# Patient Record
Sex: MALE | Race: WHITE | Employment: OTHER | URBAN - METROPOLITAN AREA
[De-identification: names, ages, dates, MRNs, and addresses within clinical notes are randomized per-mention and may not be internally consistent; named-entity substitution may affect disease eponyms.]

---

## 2018-11-19 PROBLEM — D50.0 IRON DEFICIENCY ANEMIA SECONDARY TO BLOOD LOSS (CHRONIC): Status: ACTIVE | Noted: 2018-11-19

## 2018-11-28 DIAGNOSIS — D50.0 IRON DEFICIENCY ANEMIA SECONDARY TO BLOOD LOSS (CHRONIC): ICD-10-CM

## 2018-11-28 LAB
ALBUMIN SERPL-MCNC: 4.2 G/DL (ref 3.9–4.9)
ALP BLD-CCNC: 80 U/L (ref 35–104)
ALT SERPL-CCNC: 15 U/L (ref 0–41)
ANION GAP SERPL CALCULATED.3IONS-SCNC: 16 MEQ/L (ref 7–13)
AST SERPL-CCNC: 28 U/L (ref 0–40)
BILIRUB SERPL-MCNC: 0.3 MG/DL (ref 0–1.2)
BUN BLDV-MCNC: 23 MG/DL (ref 8–23)
CALCIUM SERPL-MCNC: 9.5 MG/DL (ref 8.6–10.2)
CHLORIDE BLD-SCNC: 107 MEQ/L (ref 98–107)
CO2: 23 MEQ/L (ref 22–29)
CREAT SERPL-MCNC: 1.13 MG/DL (ref 0.7–1.2)
FERRITIN: 195.5 NG/ML (ref 30–400)
GFR AFRICAN AMERICAN: >60
GFR NON-AFRICAN AMERICAN: >60
GLOBULIN: 3.1 G/DL (ref 2.3–3.5)
GLUCOSE BLD-MCNC: 100 MG/DL (ref 74–109)
IRON SATURATION: 31 % (ref 11–46)
IRON: 88 UG/DL (ref 59–158)
POTASSIUM SERPL-SCNC: 4.3 MEQ/L (ref 3.5–5.1)
SODIUM BLD-SCNC: 146 MEQ/L (ref 132–144)
TOTAL IRON BINDING CAPACITY: 281 UG/DL (ref 178–450)
TOTAL PROTEIN: 7.3 G/DL (ref 6.4–8.1)

## 2019-01-09 DIAGNOSIS — D47.3 ESSENTIAL THROMBOCYTHEMIA (HCC): ICD-10-CM

## 2019-01-09 DIAGNOSIS — D50.0 IRON DEFICIENCY ANEMIA SECONDARY TO BLOOD LOSS (CHRONIC): ICD-10-CM

## 2019-01-09 LAB
ALBUMIN SERPL-MCNC: 4.3 G/DL (ref 3.9–4.9)
ALP BLD-CCNC: 87 U/L (ref 35–104)
ALT SERPL-CCNC: 7 U/L (ref 0–41)
AST SERPL-CCNC: 27 U/L (ref 0–40)
BILIRUB SERPL-MCNC: 0.3 MG/DL (ref 0–1.2)
BILIRUBIN DIRECT: <0.2 MG/DL (ref 0–0.3)
BILIRUBIN, INDIRECT: NORMAL MG/DL (ref 0–0.6)
TOTAL PROTEIN: 7.5 G/DL (ref 6.4–8.1)

## 2019-03-27 DIAGNOSIS — D50.0 IRON DEFICIENCY ANEMIA SECONDARY TO BLOOD LOSS (CHRONIC): ICD-10-CM

## 2019-03-27 DIAGNOSIS — D47.3 ESSENTIAL THROMBOCYTHEMIA (HCC): ICD-10-CM

## 2019-03-27 LAB
ALBUMIN SERPL-MCNC: 4.2 G/DL (ref 3.5–4.6)
ALP BLD-CCNC: 80 U/L (ref 35–104)
ALT SERPL-CCNC: 18 U/L (ref 0–41)
ANION GAP SERPL CALCULATED.3IONS-SCNC: 11 MEQ/L (ref 9–15)
AST SERPL-CCNC: 35 U/L (ref 0–40)
BILIRUB SERPL-MCNC: 0.4 MG/DL (ref 0.2–0.7)
BUN BLDV-MCNC: 22 MG/DL (ref 8–23)
CALCIUM SERPL-MCNC: 8.8 MG/DL (ref 8.5–9.9)
CHLORIDE BLD-SCNC: 107 MEQ/L (ref 95–107)
CO2: 24 MEQ/L (ref 20–31)
CREAT SERPL-MCNC: 1.17 MG/DL (ref 0.7–1.2)
GFR AFRICAN AMERICAN: >60
GFR NON-AFRICAN AMERICAN: 58.6
GLOBULIN: 2.8 G/DL (ref 2.3–3.5)
GLUCOSE BLD-MCNC: 104 MG/DL (ref 70–99)
POTASSIUM SERPL-SCNC: 4.1 MEQ/L (ref 3.4–4.9)
SODIUM BLD-SCNC: 142 MEQ/L (ref 135–144)
TOTAL PROTEIN: 7 G/DL (ref 6.3–8)

## 2019-06-28 ENCOUNTER — HOSPITAL ENCOUNTER (INPATIENT)
Age: 84
LOS: 3 days | Discharge: NURSING, REHAB OR LONG-TERM CARE | DRG: 292 | End: 2019-07-01
Attending: EMERGENCY MEDICINE | Admitting: INTERNAL MEDICINE
Payer: MEDICARE

## 2019-06-28 ENCOUNTER — APPOINTMENT (OUTPATIENT)
Dept: ULTRASOUND IMAGING | Age: 84
DRG: 292 | End: 2019-06-28
Payer: MEDICARE

## 2019-06-28 ENCOUNTER — APPOINTMENT (OUTPATIENT)
Dept: GENERAL RADIOLOGY | Age: 84
DRG: 292 | End: 2019-06-28
Payer: MEDICARE

## 2019-06-28 DIAGNOSIS — D64.9 CHRONIC ANEMIA: ICD-10-CM

## 2019-06-28 DIAGNOSIS — I50.9 CONGESTIVE HEART FAILURE, UNSPECIFIED HF CHRONICITY, UNSPECIFIED HEART FAILURE TYPE (HCC): ICD-10-CM

## 2019-06-28 DIAGNOSIS — I48.20 CHRONIC ATRIAL FIBRILLATION (HCC): ICD-10-CM

## 2019-06-28 DIAGNOSIS — R60.9 PERIPHERAL EDEMA: Primary | ICD-10-CM

## 2019-06-28 DIAGNOSIS — Z66 DNR (DO NOT RESUSCITATE): ICD-10-CM

## 2019-06-28 LAB
ABO/RH: NORMAL
ABO/RH: NORMAL
ALBUMIN SERPL-MCNC: 4 G/DL (ref 3.5–4.6)
ALP BLD-CCNC: 91 U/L (ref 35–104)
ALT SERPL-CCNC: 16 U/L (ref 0–41)
ANION GAP SERPL CALCULATED.3IONS-SCNC: 14 MEQ/L (ref 9–15)
ANISOCYTOSIS: PRESENT
ANTIBODY SCREEN: NORMAL
AST SERPL-CCNC: 33 U/L (ref 0–40)
BANDED NEUTROPHILS RELATIVE PERCENT: 2 %
BASOPHILS ABSOLUTE: 0 K/UL (ref 0–0.2)
BASOPHILS RELATIVE PERCENT: 0 %
BILIRUB SERPL-MCNC: 0.5 MG/DL (ref 0.2–0.7)
BLOOD BANK DISPENSE STATUS: NORMAL
BLOOD BANK DISPENSE STATUS: NORMAL
BLOOD BANK PRODUCT CODE: NORMAL
BLOOD BANK PRODUCT CODE: NORMAL
BPU ID: NORMAL
BPU ID: NORMAL
BUN BLDV-MCNC: 27 MG/DL (ref 8–23)
CALCIUM SERPL-MCNC: 9.3 MG/DL (ref 8.5–9.9)
CHLORIDE BLD-SCNC: 106 MEQ/L (ref 95–107)
CO2: 24 MEQ/L (ref 20–31)
CREAT SERPL-MCNC: 1.46 MG/DL (ref 0.7–1.2)
DESCRIPTION BLOOD BANK: NORMAL
DESCRIPTION BLOOD BANK: NORMAL
EKG ATRIAL RATE: 77 BPM
EKG Q-T INTERVAL: 394 MS
EKG QRS DURATION: 108 MS
EKG QTC CALCULATION (BAZETT): 481 MS
EKG R AXIS: -39 DEGREES
EKG T AXIS: 128 DEGREES
EKG VENTRICULAR RATE: 90 BPM
EOSINOPHILS ABSOLUTE: 0.1 K/UL (ref 0–0.7)
EOSINOPHILS RELATIVE PERCENT: 2 %
FERRITIN: 109.1 NG/ML (ref 30–400)
FOLATE: >20 NG/ML (ref 7.3–26.1)
GFR AFRICAN AMERICAN: 54.9
GFR NON-AFRICAN AMERICAN: 45.4
GLOBULIN: 3.2 G/DL (ref 2.3–3.5)
GLUCOSE BLD-MCNC: 124 MG/DL (ref 70–99)
HCT VFR BLD CALC: 23 % (ref 42–52)
HEMOGLOBIN: 7.4 G/DL (ref 14–18)
INR BLD: 2.2
IRON SATURATION: 21 % (ref 11–46)
IRON: 82 UG/DL (ref 59–158)
LV EF: 25 %
LVEF MODALITY: NORMAL
LYMPHOCYTES ABSOLUTE: 0.9 K/UL (ref 1–4.8)
LYMPHOCYTES RELATIVE PERCENT: 13 %
MAGNESIUM: 2.3 MG/DL (ref 1.7–2.4)
MCH RBC QN AUTO: 31.7 PG (ref 27–31.3)
MCHC RBC AUTO-ENTMCNC: 32 % (ref 33–37)
MCV RBC AUTO: 98.9 FL (ref 80–100)
MONOCYTES ABSOLUTE: 0.4 K/UL (ref 0.2–0.8)
MONOCYTES RELATIVE PERCENT: 5 %
NEUTROPHILS ABSOLUTE: 5.9 K/UL (ref 1.4–6.5)
NEUTROPHILS RELATIVE PERCENT: 79 %
OVALOCYTES: ABNORMAL
PDW BLD-RTO: 22.6 % (ref 11.5–14.5)
PLATELET # BLD: 313 K/UL (ref 130–400)
POIKILOCYTES: ABNORMAL
POTASSIUM SERPL-SCNC: 4 MEQ/L (ref 3.4–4.9)
PRO-BNP: NORMAL PG/ML
PROTHROMBIN TIME: 21.5 SEC (ref 9–11.5)
RBC # BLD: 2.32 M/UL (ref 4.7–6.1)
SCHISTOCYTES: ABNORMAL
SODIUM BLD-SCNC: 144 MEQ/L (ref 135–144)
TOTAL IRON BINDING CAPACITY: 387 UG/DL (ref 178–450)
TOTAL PROTEIN: 7.2 G/DL (ref 6.3–8)
TROPONIN: 0.06 NG/ML (ref 0–0.01)
TROPONIN: 0.06 NG/ML (ref 0–0.01)
TROPONIN: 0.07 NG/ML (ref 0–0.01)
TSH SERPL DL<=0.05 MIU/L-ACNC: 3.64 UIU/ML (ref 0.44–3.86)
VITAMIN B-12: 1551 PG/ML (ref 232–1245)
WBC # BLD: 7.3 K/UL (ref 4.8–10.8)

## 2019-06-28 PROCEDURE — 36415 COLL VENOUS BLD VENIPUNCTURE: CPT

## 2019-06-28 PROCEDURE — 71045 X-RAY EXAM CHEST 1 VIEW: CPT

## 2019-06-28 PROCEDURE — 6370000000 HC RX 637 (ALT 250 FOR IP): Performed by: INTERNAL MEDICINE

## 2019-06-28 PROCEDURE — 84484 ASSAY OF TROPONIN QUANT: CPT

## 2019-06-28 PROCEDURE — 93971 EXTREMITY STUDY: CPT

## 2019-06-28 PROCEDURE — 93306 TTE W/DOPPLER COMPLETE: CPT

## 2019-06-28 PROCEDURE — 96374 THER/PROPH/DIAG INJ IV PUSH: CPT

## 2019-06-28 PROCEDURE — 99285 EMERGENCY DEPT VISIT HI MDM: CPT

## 2019-06-28 PROCEDURE — 80053 COMPREHEN METABOLIC PANEL: CPT

## 2019-06-28 PROCEDURE — 83550 IRON BINDING TEST: CPT

## 2019-06-28 PROCEDURE — 51702 INSERT TEMP BLADDER CATH: CPT

## 2019-06-28 PROCEDURE — 2580000003 HC RX 258: Performed by: INTERNAL MEDICINE

## 2019-06-28 PROCEDURE — P9016 RBC LEUKOCYTES REDUCED: HCPCS

## 2019-06-28 PROCEDURE — 82607 VITAMIN B-12: CPT

## 2019-06-28 PROCEDURE — 82746 ASSAY OF FOLIC ACID SERUM: CPT

## 2019-06-28 PROCEDURE — 94761 N-INVAS EAR/PLS OXIMETRY MLT: CPT

## 2019-06-28 PROCEDURE — 85610 PROTHROMBIN TIME: CPT

## 2019-06-28 PROCEDURE — 94640 AIRWAY INHALATION TREATMENT: CPT

## 2019-06-28 PROCEDURE — 86923 COMPATIBILITY TEST ELECTRIC: CPT

## 2019-06-28 PROCEDURE — 1210000000 HC MED SURG R&B

## 2019-06-28 PROCEDURE — 6360000002 HC RX W HCPCS: Performed by: EMERGENCY MEDICINE

## 2019-06-28 PROCEDURE — 6360000002 HC RX W HCPCS: Performed by: INTERNAL MEDICINE

## 2019-06-28 PROCEDURE — 82728 ASSAY OF FERRITIN: CPT

## 2019-06-28 PROCEDURE — 36430 TRANSFUSION BLD/BLD COMPNT: CPT

## 2019-06-28 PROCEDURE — 85025 COMPLETE CBC W/AUTO DIFF WBC: CPT

## 2019-06-28 PROCEDURE — 93005 ELECTROCARDIOGRAM TRACING: CPT | Performed by: EMERGENCY MEDICINE

## 2019-06-28 PROCEDURE — 93005 ELECTROCARDIOGRAM TRACING: CPT

## 2019-06-28 PROCEDURE — 6370000000 HC RX 637 (ALT 250 FOR IP): Performed by: EMERGENCY MEDICINE

## 2019-06-28 PROCEDURE — 94760 N-INVAS EAR/PLS OXIMETRY 1: CPT

## 2019-06-28 PROCEDURE — 86900 BLOOD TYPING SEROLOGIC ABO: CPT

## 2019-06-28 PROCEDURE — 83880 ASSAY OF NATRIURETIC PEPTIDE: CPT

## 2019-06-28 PROCEDURE — 83540 ASSAY OF IRON: CPT

## 2019-06-28 PROCEDURE — 86850 RBC ANTIBODY SCREEN: CPT

## 2019-06-28 PROCEDURE — 86901 BLOOD TYPING SEROLOGIC RH(D): CPT

## 2019-06-28 PROCEDURE — 83735 ASSAY OF MAGNESIUM: CPT

## 2019-06-28 PROCEDURE — 84443 ASSAY THYROID STIM HORMONE: CPT

## 2019-06-28 RX ORDER — SODIUM CHLORIDE 0.9 % (FLUSH) 0.9 %
10 SYRINGE (ML) INJECTION EVERY 12 HOURS SCHEDULED
Status: DISCONTINUED | OUTPATIENT
Start: 2019-06-28 | End: 2019-07-01 | Stop reason: HOSPADM

## 2019-06-28 RX ORDER — ONDANSETRON 2 MG/ML
4 INJECTION INTRAMUSCULAR; INTRAVENOUS EVERY 6 HOURS PRN
Status: DISCONTINUED | OUTPATIENT
Start: 2019-06-28 | End: 2019-07-01 | Stop reason: HOSPADM

## 2019-06-28 RX ORDER — FOLIC ACID 1 MG/1
1 TABLET ORAL DAILY
Status: DISCONTINUED | OUTPATIENT
Start: 2019-06-28 | End: 2019-07-01 | Stop reason: HOSPADM

## 2019-06-28 RX ORDER — SODIUM CHLORIDE 0.9 % (FLUSH) 0.9 %
10 SYRINGE (ML) INJECTION PRN
Status: DISCONTINUED | OUTPATIENT
Start: 2019-06-28 | End: 2019-07-01 | Stop reason: HOSPADM

## 2019-06-28 RX ORDER — FUROSEMIDE 10 MG/ML
20 INJECTION INTRAMUSCULAR; INTRAVENOUS ONCE
Status: COMPLETED | OUTPATIENT
Start: 2019-06-28 | End: 2019-06-28

## 2019-06-28 RX ORDER — FUROSEMIDE 10 MG/ML
20 INJECTION INTRAMUSCULAR; INTRAVENOUS 3 TIMES DAILY
Status: DISCONTINUED | OUTPATIENT
Start: 2019-06-28 | End: 2019-06-29

## 2019-06-28 RX ORDER — WARFARIN SODIUM 2 MG/1
2 TABLET ORAL
Status: ACTIVE | OUTPATIENT
Start: 2019-06-28 | End: 2019-06-29

## 2019-06-28 RX ORDER — ANAGRELIDE 0.5 MG/1
1 CAPSULE ORAL 2 TIMES DAILY
Status: DISCONTINUED | OUTPATIENT
Start: 2019-06-28 | End: 2019-07-01 | Stop reason: HOSPADM

## 2019-06-28 RX ORDER — SODIUM CHLORIDE 0.9 % (FLUSH) 0.9 %
10 SYRINGE (ML) INJECTION EVERY 12 HOURS
Status: DISCONTINUED | OUTPATIENT
Start: 2019-06-28 | End: 2019-06-29

## 2019-06-28 RX ORDER — IPRATROPIUM BROMIDE AND ALBUTEROL SULFATE 2.5; .5 MG/3ML; MG/3ML
1 SOLUTION RESPIRATORY (INHALATION) ONCE
Status: COMPLETED | OUTPATIENT
Start: 2019-06-28 | End: 2019-06-28

## 2019-06-28 RX ORDER — 0.9 % SODIUM CHLORIDE 0.9 %
250 INTRAVENOUS SOLUTION INTRAVENOUS ONCE
Status: COMPLETED | OUTPATIENT
Start: 2019-06-28 | End: 2019-06-29

## 2019-06-28 RX ORDER — FERROUS SULFATE 325(65) MG
325 TABLET ORAL
Status: DISCONTINUED | OUTPATIENT
Start: 2019-06-29 | End: 2019-07-01 | Stop reason: HOSPADM

## 2019-06-28 RX ORDER — WARFARIN SODIUM 2 MG/1
2 TABLET ORAL DAILY
Status: DISCONTINUED | OUTPATIENT
Start: 2019-06-28 | End: 2019-06-28 | Stop reason: DRUGHIGH

## 2019-06-28 RX ADMIN — ANAGRELIDE 1 MG: 0.5 CAPSULE ORAL at 20:15

## 2019-06-28 RX ADMIN — FUROSEMIDE 20 MG: 10 INJECTION, SOLUTION INTRAMUSCULAR; INTRAVENOUS at 20:13

## 2019-06-28 RX ADMIN — SODIUM CHLORIDE 250 ML: 9 INJECTION, SOLUTION INTRAVENOUS at 18:20

## 2019-06-28 RX ADMIN — METOPROLOL TARTRATE 25 MG: 25 TABLET ORAL at 14:47

## 2019-06-28 RX ADMIN — FUROSEMIDE 20 MG: 10 INJECTION, SOLUTION INTRAMUSCULAR; INTRAVENOUS at 22:01

## 2019-06-28 RX ADMIN — FUROSEMIDE 20 MG: 10 INJECTION, SOLUTION INTRAMUSCULAR; INTRAVENOUS at 14:47

## 2019-06-28 RX ADMIN — Medication 10 ML: at 14:48

## 2019-06-28 RX ADMIN — METOPROLOL TARTRATE 25 MG: 25 TABLET ORAL at 20:13

## 2019-06-28 RX ADMIN — IPRATROPIUM BROMIDE AND ALBUTEROL SULFATE 1 AMPULE: .5; 3 SOLUTION RESPIRATORY (INHALATION) at 08:29

## 2019-06-28 RX ADMIN — FUROSEMIDE 20 MG: 10 INJECTION, SOLUTION INTRAMUSCULAR; INTRAVENOUS at 08:21

## 2019-06-28 RX ADMIN — FUROSEMIDE 20 MG: 10 INJECTION, SOLUTION INTRAMUSCULAR; INTRAVENOUS at 22:00

## 2019-06-28 ASSESSMENT — PAIN DESCRIPTION - FREQUENCY: FREQUENCY: INTERMITTENT

## 2019-06-28 ASSESSMENT — ENCOUNTER SYMPTOMS
SHORTNESS OF BREATH: 0
CONSTIPATION: 0
BLOOD IN STOOL: 0
ANAL BLEEDING: 0
STRIDOR: 0
SORE THROAT: 0
WHEEZING: 0
VOMITING: 0
EYE PAIN: 0
CHOKING: 0
TROUBLE SWALLOWING: 0
ABDOMINAL DISTENTION: 0
COUGH: 0
SINUS PRESSURE: 0
APNEA: 0
WHEEZING: 1
BACK PAIN: 0
ABDOMINAL PAIN: 0
VOICE CHANGE: 0
DIARRHEA: 0
EYE DISCHARGE: 0
NAUSEA: 0
COLOR CHANGE: 0
FACIAL SWELLING: 0
CHEST TIGHTNESS: 0
EYE REDNESS: 0
SHORTNESS OF BREATH: 1

## 2019-06-28 ASSESSMENT — PAIN SCALES - GENERAL
PAINLEVEL_OUTOF10: 0
PAINLEVEL_OUTOF10: 0
PAINLEVEL_OUTOF10: 9

## 2019-06-28 ASSESSMENT — PAIN DESCRIPTION - ORIENTATION: ORIENTATION: RIGHT

## 2019-06-28 ASSESSMENT — PAIN DESCRIPTION - LOCATION: LOCATION: LEG

## 2019-06-28 NOTE — ED NOTES
Pt A&Ox4, Pt hard of hearing per Drt and Dtr in law at bedside. states \"my legs don't work good\", Pt's daughter at bedside states Delroy Rios gets short of breath when walking for the past two weeks\", Pt stated \"I've had enough of this\" and that is why he came in today. Pt denies chest pain, denies N&V.      Deandre Rosales RN  06/28/19 3390

## 2019-06-28 NOTE — ED PROVIDER NOTES
[address] [facility]  eMERGENCY dEPARTMENT eNCOUnter      Pt Name: Josef Walls  MRN: [id number]  [place] 9/9/1929  Date of evaluation: 6/28/2019  Provider: Bruce Pederson MD    [address] [place] [address]       Chief Complaint   Patient presents with    Leg Swelling     x 2 weeks    Shortness of Breath       HISTORY OF PRESENT ILLNESS   (Location/Symptom, Timing/Onset,Context/Setting, Quality, Duration, Modifying Factors, Severity)  Note limiting factors. Josef Walls is a 80 y.o. male who presents to the emergency department patient is here brought it here by the family with the chief complaining of increasing weakness used to walk with a walker but difficult time walking short of breath worse on exertion increasing swelling of the legs patient has a history of cardiac arrhythmia as per family members, but has no heart doctor, patient does see blood doctors for his blood transfusion and at an infusion denies seeing any blood in the stools does take iron by mouth including short of breath no documented fever. Patient denies pain anywhere    HPI    NursingNotes were reviewed. REVIEW OF SYSTEMS    (2-9 systems for level 4, 10 or more for level 5)     Review of Systems   Constitutional: Positive for activity change, appetite change and fatigue. Negative for fever. HENT: Negative for congestion, drooling, facial swelling, mouth sores, nosebleeds, sinus pressure, sore throat, trouble swallowing and voice change. Eyes: Negative for pain, discharge, redness and visual disturbance. Respiratory: Positive for shortness of breath and wheezing. Negative for cough, choking, chest tightness and stridor. Cardiovascular: Positive for leg swelling. Negative for chest pain. Gastrointestinal: Negative for abdominal pain, blood in stool, constipation, diarrhea and vomiting. Endocrine: Negative for cold intolerance, polyphagia and polyuria.    Genitourinary: Negative for dysuria, flank pain, frequency, genital sores and Aunt     No Known Problems Maternal Uncle     No Known Problems Paternal Aunt     No Known Problems Paternal Uncle     No Known Problems Maternal Grandmother     No Known Problems Maternal Grandfather     No Known Problems Paternal Grandmother     No Known Problems Paternal Grandfather     No Known Problems Other           SOCIAL HISTORY       Social History     Socioeconomic History    Marital status:       Spouse name: None    Number of children: None    Years of education: None    Highest education level: None   Occupational History    None   Social Needs    Financial resource strain: None    Food insecurity:     Worry: None     Inability: None    Transportation needs:     Medical: None     Non-medical: None   Tobacco Use    Smoking status: Former Smoker     Packs/day: 0.50     Years: 20.00     Pack years: 10.00     Types: Cigarettes     Last attempt to quit: Jose Francisco Stone     Years since quittin.5    Smokeless tobacco: Never Used   Substance and Sexual Activity    Alcohol use: No    Drug use: No    Sexual activity: None   Lifestyle    Physical activity:     Days per week: None     Minutes per session: None    Stress: None   Relationships    Social connections:     Talks on phone: None     Gets together: None     Attends Worship service: None     Active member of club or organization: None     Attends meetings of clubs or organizations: None     Relationship status: None    Intimate partner violence:     Fear of current or ex partner: None     Emotionally abused: None     Physically abused: None     Forced sexual activity: None   Other Topics Concern    None   Social History Narrative    None       SCREENINGS      @FLOW([id number])@      PHYSICAL EXAM    (up to 7 for level 4, 8 or more for level 5)     ED Triage Vitals [19 0753]   BP Temp Temp Source Pulse Resp SpO2 Height Weight   133/65 97.7 °F (36.5 °C) Oral 94 18 (!) 89 % 5' 8\" (1.727 m) 165 lb (74.8 kg)       Physical Exam 18 18 18    Temp: 97.7 °F (36.5 °C)  97.5 °F (36.4 °C)    TempSrc: Oral  Oral    SpO2: (!) 89% 97% 97% 98%   Weight: 165 lb (74.8 kg)      Height: 5' 8\" (1.727 m)              MDM  Number of Diagnoses or Management Options  Chronic anemia:   Chronic atrial fibrillation ([address]):   Congestive heart failure, unspecified HF chronicity, unspecified heart failure type ([address]): established and improving  DNR (do not resuscitate):   Peripheral edema:   Diagnosis management comments: Patient with a history of cardiac arrhythmia chronic anemia and has a chronic intermittent CHF comes to this emergency brought by family members for deteriorating short of breath increasing weakness and difficult time ambulating difficult time getting at home by the family EKG performed patient has atrial fib with a premature ventricular beats left axis deviation and a rate of 90 per minute QRS duration 108 ms with QT interval 394 ms       Amount and/or Complexity of Data Reviewed  Clinical lab tests: ordered and reviewed  Tests in the radiology section of CPT®: ordered and reviewed        CRITICAL CARE TIME   Total Critical Care time was  minutes, excluding separately reportableprocedures. There was a high probability of clinicallysignificant/life threatening deterioration in the patient's condition which required my urgent intervention. NSULTS:  IP CONSULT TO CARDIOLOGY  IP CONSULT TO SOCIAL WORK  IP CONSULT TO PHARMACY    PROCEDURES:  Unless otherwise noted below, none     Procedures    FINAL IMPRESSION      1. Peripheral edema    2. Congestive heart failure, unspecified HF chronicity, unspecified heart failure type ([address]    3. Chronic atrial fibrillation ([address]    4. Chronic anemia    5.  DNR (do not resuscitate)          DISPOSITION/PLAN   DISPOSITION        PATIENT REFERRED TO:  [address] [place] [address] MD Blaise Atwood [phone]            DISCHARGE MEDICATIONS:  Current Discharge Medication List (Please note that portions of this note were completed with a voice recognition program.  Efforts were made to edit the dictations but occasionally words are mis-transcribed.)    Karan Anand MD (electronically signed)  Attending Emergency Physician       Karan Anand MD  06/28/19 2948       Karan Anand MD  06/28/19 1674

## 2019-06-28 NOTE — H&P
currently lives at home    TOBACCO:   reports that he quit smoking about 41 years ago. His smoking use included cigarettes. He has a 10.00 pack-year smoking history. He has never used smokeless tobacco.  ETOH:   reports that he does not drink alcohol. Family History:       Reviewed in detail and negative for DM, CAD, Cancer, CVA. Positive as follows:        Problem Relation Age of Onset    Thyroid Disease Mother     Heart Attack Father     No Known Problems Sister     No Known Problems Brother     No Known Problems Maternal Aunt     No Known Problems Maternal Uncle     No Known Problems Paternal Aunt     No Known Problems Paternal Uncle     No Known Problems Maternal Grandmother     No Known Problems Maternal Grandfather     No Known Problems Paternal Grandmother     No Known Problems Paternal Grandfather     No Known Problems Other        REVIEW OF SYSTEMS:   Pertinent positives as noted in the HPI. All other systems reviewed and negative. PHYSICAL EXAM:    /67   Pulse 84   Temp 97.7 °F (36.5 °C) (Oral)   Resp 18   Ht 5' 8\" (1.727 m)   Wt 165 lb (74.8 kg)   SpO2 97%   BMI 25.09 kg/m²     General appearance:  No apparent distress, appears stated age and cooperative. HEENT:  Normal cephalic, atraumatic without obvious deformity. Pupils equal, round, and reactive to light. Extra ocular muscles intact. Conjunctivae/corneas clear. Neck: Supple, with full range of motion. No jugular venous distention. Trachea midline. Respiratory:  Normal respiratory effort. Clear to auscultation, bilaterally without Rales/Wheezes/Rhonchi. Cardiovascular:  Regular rate and rhythm with normal S1/S2 without murmurs, rubs or gallops. Abdomen: Soft, non-tender, non-distended with normal bowel sounds. Musculoskeletal:  Massive edema bilaterally. .  Skin: Skin color, texture, turgor normal.  No rashes or lesions. Neurologic:  Neurovascularly intact without any focal sensory/motor deficits.

## 2019-06-28 NOTE — CONSULTS
 Pulmonary embolism (HCC)     Type 2 diabetes mellitus (HCC)        PSHx:  Past Surgical History:   Procedure Laterality Date    BACK SURGERY      INGUINAL HERNIA REPAIR Bilateral        Social Hx:  Social History     Socioeconomic History    Marital status:       Spouse name: None    Number of children: None    Years of education: None    Highest education level: None   Occupational History    None   Social Needs    Financial resource strain: None    Food insecurity:     Worry: None     Inability: None    Transportation needs:     Medical: None     Non-medical: None   Tobacco Use    Smoking status: Former Smoker     Packs/day: 0.50     Years: 20.00     Pack years: 10.00     Types: Cigarettes     Last attempt to quit: Jacki Dotson     Years since quittin.5    Smokeless tobacco: Never Used   Substance and Sexual Activity    Alcohol use: No    Drug use: No    Sexual activity: None   Lifestyle    Physical activity:     Days per week: None     Minutes per session: None    Stress: None   Relationships    Social connections:     Talks on phone: None     Gets together: None     Attends [employer] service: None     Active member of club or organization: None     Attends meetings of clubs or organizations: None     Relationship status: None    Intimate partner violence:     Fear of current or ex partner: None     Emotionally abused: None     Physically abused: None     Forced sexual activity: None   Other Topics Concern    None   Social History Narrative    None       Family Hx:  Family History   Problem Relation Age of Onset    Thyroid Disease Mother     Heart Attack Father     No Known Problems Sister     No Known Problems Brother     No Known Problems Maternal Aunt     No Known Problems Maternal Uncle     No Known Problems Paternal Aunt     No Known Problems Paternal Uncle     No Known Problems Maternal Grandmother     No Known Problems Maternal Grandfather     No Known Problems

## 2019-06-28 NOTE — PROGRESS NOTES
Chart reviewed. Patient was admitted to [facility] after being brought into ED by family due to increase in weakness, SOB, and leg swelling. Patient seen by cardiology and ordered further testing. Patient also has orders to be evaluated by PT/OT. SS to follow after being evaluated by therapies and cardiac testing for DC recommendations.

## 2019-06-29 LAB
ANION GAP SERPL CALCULATED.3IONS-SCNC: 15 MEQ/L (ref 9–15)
BUN BLDV-MCNC: 26 MG/DL (ref 8–23)
CALCIUM SERPL-MCNC: 9 MG/DL (ref 8.5–9.9)
CHLORIDE BLD-SCNC: 105 MEQ/L (ref 95–107)
CO2: 26 MEQ/L (ref 20–31)
CREAT SERPL-MCNC: 1.52 MG/DL (ref 0.7–1.2)
GFR AFRICAN AMERICAN: 52.4
GFR NON-AFRICAN AMERICAN: 43.3
GLUCOSE BLD-MCNC: 91 MG/DL (ref 70–99)
HCT VFR BLD CALC: 26.9 % (ref 42–52)
HEMOGLOBIN: 8.9 G/DL (ref 14–18)
INR BLD: 2
MCH RBC QN AUTO: 31.8 PG (ref 27–31.3)
MCHC RBC AUTO-ENTMCNC: 32.9 % (ref 33–37)
MCV RBC AUTO: 96.6 FL (ref 80–100)
PDW BLD-RTO: 21.1 % (ref 11.5–14.5)
PLATELET # BLD: 250 K/UL (ref 130–400)
POTASSIUM REFLEX MAGNESIUM: 3 MEQ/L (ref 3.4–4.9)
PRO-BNP: NORMAL PG/ML
PROTHROMBIN TIME: 19 SEC (ref 9–11.5)
RBC # BLD: 2.79 M/UL (ref 4.7–6.1)
SODIUM BLD-SCNC: 146 MEQ/L (ref 135–144)
WBC # BLD: 6.4 K/UL (ref 4.8–10.8)

## 2019-06-29 PROCEDURE — 6370000000 HC RX 637 (ALT 250 FOR IP): Performed by: INTERNAL MEDICINE

## 2019-06-29 PROCEDURE — 85027 COMPLETE CBC AUTOMATED: CPT

## 2019-06-29 PROCEDURE — 80048 BASIC METABOLIC PNL TOTAL CA: CPT

## 2019-06-29 PROCEDURE — 85610 PROTHROMBIN TIME: CPT

## 2019-06-29 PROCEDURE — 2580000003 HC RX 258: Performed by: INTERNAL MEDICINE

## 2019-06-29 PROCEDURE — 6360000002 HC RX W HCPCS: Performed by: INTERNAL MEDICINE

## 2019-06-29 PROCEDURE — 83880 ASSAY OF NATRIURETIC PEPTIDE: CPT

## 2019-06-29 PROCEDURE — 1210000000 HC MED SURG R&B

## 2019-06-29 PROCEDURE — 97530 THERAPEUTIC ACTIVITIES: CPT | Performed by: PHYSICAL THERAPIST

## 2019-06-29 PROCEDURE — 97162 PT EVAL MOD COMPLEX 30 MIN: CPT | Performed by: PHYSICAL THERAPIST

## 2019-06-29 PROCEDURE — 36415 COLL VENOUS BLD VENIPUNCTURE: CPT

## 2019-06-29 PROCEDURE — 97110 THERAPEUTIC EXERCISES: CPT | Performed by: PHYSICAL THERAPIST

## 2019-06-29 PROCEDURE — 82274 ASSAY TEST FOR BLOOD FECAL: CPT

## 2019-06-29 PROCEDURE — 2700000000 HC OXYGEN THERAPY PER DAY

## 2019-06-29 PROCEDURE — 83735 ASSAY OF MAGNESIUM: CPT

## 2019-06-29 RX ORDER — DOCUSATE SODIUM 100 MG/1
100 CAPSULE, LIQUID FILLED ORAL 2 TIMES DAILY
Status: DISCONTINUED | OUTPATIENT
Start: 2019-06-29 | End: 2019-07-01 | Stop reason: HOSPADM

## 2019-06-29 RX ORDER — SENNA PLUS 8.6 MG/1
1 TABLET ORAL NIGHTLY
Status: DISCONTINUED | OUTPATIENT
Start: 2019-06-29 | End: 2019-07-01 | Stop reason: HOSPADM

## 2019-06-29 RX ORDER — POTASSIUM CHLORIDE 750 MG/1
40 TABLET, EXTENDED RELEASE ORAL ONCE
Status: COMPLETED | OUTPATIENT
Start: 2019-06-29 | End: 2019-06-29

## 2019-06-29 RX ORDER — POLYETHYLENE GLYCOL 3350 17 G/17G
17 POWDER, FOR SOLUTION ORAL DAILY
Status: DISCONTINUED | OUTPATIENT
Start: 2019-06-29 | End: 2019-07-01 | Stop reason: HOSPADM

## 2019-06-29 RX ORDER — FUROSEMIDE 10 MG/ML
20 INJECTION INTRAMUSCULAR; INTRAVENOUS DAILY
Status: DISCONTINUED | OUTPATIENT
Start: 2019-06-29 | End: 2019-06-30

## 2019-06-29 RX ADMIN — FOLIC ACID 1 MG: 1 TABLET ORAL at 08:11

## 2019-06-29 RX ADMIN — FUROSEMIDE 20 MG: 10 INJECTION, SOLUTION INTRAMUSCULAR; INTRAVENOUS at 08:12

## 2019-06-29 RX ADMIN — METOPROLOL TARTRATE 25 MG: 25 TABLET ORAL at 08:11

## 2019-06-29 RX ADMIN — METOPROLOL TARTRATE 25 MG: 25 TABLET ORAL at 20:18

## 2019-06-29 RX ADMIN — POLYETHYLENE GLYCOL 3350 17 G: 17 POWDER, FOR SOLUTION ORAL at 08:11

## 2019-06-29 RX ADMIN — POTASSIUM CHLORIDE 40 MEQ: 10 TABLET, EXTENDED RELEASE ORAL at 08:21

## 2019-06-29 RX ADMIN — Medication 10 ML: at 08:18

## 2019-06-29 RX ADMIN — ANAGRELIDE 1 MG: 0.5 CAPSULE ORAL at 08:22

## 2019-06-29 RX ADMIN — DOCUSATE SODIUM 100 MG: 100 CAPSULE, LIQUID FILLED ORAL at 20:18

## 2019-06-29 RX ADMIN — DOCUSATE SODIUM 100 MG: 100 CAPSULE, LIQUID FILLED ORAL at 08:11

## 2019-06-29 RX ADMIN — FERROUS SULFATE TAB 325 MG (65 MG ELEMENTAL FE) 325 MG: 325 (65 FE) TAB at 08:11

## 2019-06-29 RX ADMIN — SENNOSIDES 8.6 MG: 8.6 TABLET, FILM COATED ORAL at 20:18

## 2019-06-29 RX ADMIN — ANAGRELIDE 1 MG: 0.5 CAPSULE ORAL at 20:20

## 2019-06-29 RX ADMIN — Medication 10 ML: at 20:23

## 2019-06-29 RX ADMIN — Medication 10 ML: at 01:10

## 2019-06-29 ASSESSMENT — PAIN SCALES - GENERAL
PAINLEVEL_OUTOF10: 0
PAINLEVEL_OUTOF10: 0

## 2019-06-29 NOTE — PROGRESS NOTES
2nd unit PRBCs done infusing. VSS. No s/s transfusion reaction noted. Patient verbalizes no complaints at this time.

## 2019-06-30 LAB
ANION GAP SERPL CALCULATED.3IONS-SCNC: 15 MEQ/L (ref 9–15)
ANISOCYTOSIS: PRESENT
BASOPHILS ABSOLUTE: 0 K/UL (ref 0–0.2)
BASOPHILS RELATIVE PERCENT: 0.4 %
BUN BLDV-MCNC: 28 MG/DL (ref 8–23)
CALCIUM SERPL-MCNC: 8.9 MG/DL (ref 8.5–9.9)
CHLORIDE BLD-SCNC: 106 MEQ/L (ref 95–107)
CO2: 24 MEQ/L (ref 20–31)
CREAT SERPL-MCNC: 1.44 MG/DL (ref 0.7–1.2)
EOSINOPHILS ABSOLUTE: 0.1 K/UL (ref 0–0.7)
EOSINOPHILS RELATIVE PERCENT: 0.9 %
GFR AFRICAN AMERICAN: 55.8
GFR NON-AFRICAN AMERICAN: 46.1
GLUCOSE BLD-MCNC: 89 MG/DL (ref 70–99)
HCT VFR BLD CALC: 28.4 % (ref 42–52)
HEMOCCULT STL QL: NORMAL
HEMOGLOBIN: 9.3 G/DL (ref 14–18)
INR BLD: 1.8
LYMPHOCYTES ABSOLUTE: 0.6 K/UL (ref 1–4.8)
LYMPHOCYTES RELATIVE PERCENT: 9.4 %
MCH RBC QN AUTO: 32 PG (ref 27–31.3)
MCHC RBC AUTO-ENTMCNC: 32.6 % (ref 33–37)
MCV RBC AUTO: 98.1 FL (ref 80–100)
MONOCYTES ABSOLUTE: 0.4 K/UL (ref 0.2–0.8)
MONOCYTES RELATIVE PERCENT: 5.7 %
NEUTROPHILS ABSOLUTE: 5.3 K/UL (ref 1.4–6.5)
NEUTROPHILS RELATIVE PERCENT: 83.6 %
PDW BLD-RTO: 20.8 % (ref 11.5–14.5)
PLATELET # BLD: 254 K/UL (ref 130–400)
POTASSIUM SERPL-SCNC: 3.3 MEQ/L (ref 3.4–4.9)
PROTHROMBIN TIME: 16.9 SEC (ref 9–11.5)
RBC # BLD: 2.9 M/UL (ref 4.7–6.1)
SODIUM BLD-SCNC: 145 MEQ/L (ref 135–144)
WBC # BLD: 6.3 K/UL (ref 4.8–10.8)

## 2019-06-30 PROCEDURE — 82274 ASSAY TEST FOR BLOOD FECAL: CPT

## 2019-06-30 PROCEDURE — 1210000000 HC MED SURG R&B

## 2019-06-30 PROCEDURE — 97116 GAIT TRAINING THERAPY: CPT | Performed by: PHYSICAL THERAPIST

## 2019-06-30 PROCEDURE — 99233 SBSQ HOSP IP/OBS HIGH 50: CPT | Performed by: INTERNAL MEDICINE

## 2019-06-30 PROCEDURE — 36415 COLL VENOUS BLD VENIPUNCTURE: CPT

## 2019-06-30 PROCEDURE — 80048 BASIC METABOLIC PNL TOTAL CA: CPT

## 2019-06-30 PROCEDURE — 6370000000 HC RX 637 (ALT 250 FOR IP): Performed by: INTERNAL MEDICINE

## 2019-06-30 PROCEDURE — 2580000003 HC RX 258: Performed by: INTERNAL MEDICINE

## 2019-06-30 PROCEDURE — 85025 COMPLETE CBC W/AUTO DIFF WBC: CPT

## 2019-06-30 PROCEDURE — 6360000002 HC RX W HCPCS: Performed by: INTERNAL MEDICINE

## 2019-06-30 PROCEDURE — 97535 SELF CARE MNGMENT TRAINING: CPT

## 2019-06-30 PROCEDURE — 97110 THERAPEUTIC EXERCISES: CPT | Performed by: PHYSICAL THERAPIST

## 2019-06-30 PROCEDURE — 85610 PROTHROMBIN TIME: CPT

## 2019-06-30 PROCEDURE — 97166 OT EVAL MOD COMPLEX 45 MIN: CPT

## 2019-06-30 PROCEDURE — 97530 THERAPEUTIC ACTIVITIES: CPT

## 2019-06-30 PROCEDURE — 97530 THERAPEUTIC ACTIVITIES: CPT | Performed by: PHYSICAL THERAPIST

## 2019-06-30 RX ORDER — POTASSIUM CHLORIDE 750 MG/1
40 TABLET, EXTENDED RELEASE ORAL ONCE
Status: COMPLETED | OUTPATIENT
Start: 2019-06-30 | End: 2019-06-30

## 2019-06-30 RX ORDER — FUROSEMIDE 10 MG/ML
20 INJECTION INTRAMUSCULAR; INTRAVENOUS 2 TIMES DAILY
Status: DISCONTINUED | OUTPATIENT
Start: 2019-06-30 | End: 2019-07-01 | Stop reason: HOSPADM

## 2019-06-30 RX ORDER — POTASSIUM CHLORIDE 20MEQ/15ML
20 LIQUID (ML) ORAL DAILY
Status: DISCONTINUED | OUTPATIENT
Start: 2019-07-01 | End: 2019-07-01 | Stop reason: HOSPADM

## 2019-06-30 RX ADMIN — Medication 10 ML: at 20:11

## 2019-06-30 RX ADMIN — DOCUSATE SODIUM 100 MG: 100 CAPSULE, LIQUID FILLED ORAL at 20:10

## 2019-06-30 RX ADMIN — SACUBITRIL AND VALSARTAN 1 TABLET: 24; 26 TABLET, FILM COATED ORAL at 14:22

## 2019-06-30 RX ADMIN — SENNOSIDES 8.6 MG: 8.6 TABLET, FILM COATED ORAL at 20:10

## 2019-06-30 RX ADMIN — FOLIC ACID 1 MG: 1 TABLET ORAL at 08:18

## 2019-06-30 RX ADMIN — POLYETHYLENE GLYCOL 3350 17 G: 17 POWDER, FOR SOLUTION ORAL at 08:16

## 2019-06-30 RX ADMIN — DOCUSATE SODIUM 100 MG: 100 CAPSULE, LIQUID FILLED ORAL at 08:18

## 2019-06-30 RX ADMIN — FUROSEMIDE 20 MG: 10 INJECTION, SOLUTION INTRAMUSCULAR; INTRAVENOUS at 17:49

## 2019-06-30 RX ADMIN — Medication 10 ML: at 08:27

## 2019-06-30 RX ADMIN — FUROSEMIDE 20 MG: 10 INJECTION, SOLUTION INTRAMUSCULAR; INTRAVENOUS at 08:18

## 2019-06-30 RX ADMIN — SACUBITRIL AND VALSARTAN 1 TABLET: 24; 26 TABLET, FILM COATED ORAL at 20:10

## 2019-06-30 RX ADMIN — FERROUS SULFATE TAB 325 MG (65 MG ELEMENTAL FE) 325 MG: 325 (65 FE) TAB at 08:17

## 2019-06-30 RX ADMIN — ANAGRELIDE 1 MG: 0.5 CAPSULE ORAL at 20:11

## 2019-06-30 RX ADMIN — POTASSIUM CHLORIDE 40 MEQ: 10 TABLET, EXTENDED RELEASE ORAL at 08:24

## 2019-06-30 RX ADMIN — ANAGRELIDE 1 MG: 0.5 CAPSULE ORAL at 08:19

## 2019-06-30 RX ADMIN — METOPROLOL TARTRATE 25 MG: 25 TABLET ORAL at 08:18

## 2019-06-30 RX ADMIN — METOPROLOL TARTRATE 25 MG: 25 TABLET ORAL at 20:10

## 2019-06-30 ASSESSMENT — PAIN SCALES - GENERAL
PAINLEVEL_OUTOF10: 0

## 2019-06-30 NOTE — PROGRESS NOTES
content normal.         LABS:  Recent Results (from the past 24 hour(s))   BLOOD OCCULT STOOL SCREEN #1    Collection Time: 06/29/19  1:05 PM   Result Value Ref Range    OCCULT BLOOD FECAL Negative  Normal Range:Negative      Protime-INR    Collection Time: 06/30/19  6:27 AM   Result Value Ref Range    Protime 16.9 (H) 9.0 - 11.5 sec    INR 1.8    Basic Metabolic Panel    Collection Time: 06/30/19  6:27 AM   Result Value Ref Range    Sodium 145 (H) 135 - 144 mEq/L    Potassium 3.3 (L) 3.4 - 4.9 mEq/L    Chloride 106 95 - 107 mEq/L    CO2 24 20 - 31 mEq/L    Anion Gap 15 9 - 15 mEq/L    Glucose 89 70 - 99 mg/dL    BUN 28 (H) 8 - 23 mg/dL    CREATININE 1.44 (H) 0.70 - 1.20 mg/dL    GFR Non-African American 46.1 (L) >60    GFR  55.8 (L) >60    Calcium 8.9 8.5 - 9.9 mg/dL   CBC Auto Differential    Collection Time: 06/30/19  6:27 AM   Result Value Ref Range    WBC 6.3 4.8 - 10.8 K/uL    RBC 2.90 (L) 4.70 - 6.10 M/uL    Hemoglobin 9.3 (L) 14.0 - 18.0 g/dL    Hematocrit 28.4 (L) 42.0 - 52.0 %    MCV 98.1 80.0 - 100.0 fL    MCH 32.0 (H) 27.0 - 31.3 pg    MCHC 32.6 (L) 33.0 - 37.0 %    RDW 20.8 (H) 11.5 - 14.5 %    Platelets 715 819 - 691 K/uL    Neutrophils % 83.6 %    Lymphocytes % 9.4 %    Monocytes % 5.7 %    Eosinophils % 0.9 %    Basophils % 0.4 %    Neutrophils # 5.3 1.4 - 6.5 K/uL    Lymphocytes # 0.6 (L) 1.0 - 4.8 K/uL    Monocytes # 0.4 0.2 - 0.8 K/uL    Eosinophils # 0.1 0.0 - 0.7 K/uL    Basophils # 0.0 0.0 - 0.2 K/uL    Anisocytosis PRESENT      Troponin:    Lab Results   Component Value Date    TROPONINI 0.055 06/28/2019       EKG: atrial fibrillation      ASSESSMENT:    NSTEMI- Type II  AD combined HF  Hx of chronic Afib  Hx of DVT s/p IVCF  Hx of GERD  Hypokalemia. CKD      PLAN:   1. As always, aggressive risk factor modification is strongly recommended. We should adhere to the JNC VIII guidelines for HTN management and the NCEP ATP III guidelines for LDL-C management.   2. Continue with IV

## 2019-06-30 NOTE — PROGRESS NOTES
Hospitalist Progress Note      PCP: Juma Almodovar MD    Date of Admission: 6/28/2019    Chief Complaint: weakness, edema    Subjective: pt in gra[place], looks comfortable     Medications:  Reviewed    Infusion Medications   Scheduled Medications    sacubitril-valsartan  1 tablet Oral BID    polyethylene glycol  17 g Oral Daily    docusate sodium  100 mg Oral BID    senna  1 tablet Oral Nightly    furosemide  20 mg Intravenous Daily    sodium chloride flush  10 mL Intravenous 2 times per day    anagrelide  1 mg Oral BID    ferrous sulfate  325 mg Oral Daily with breakfast    folic acid  1 mg Oral Daily    metoprolol tartrate  25 mg Oral BID     PRN Meds: sodium chloride flush, magnesium hydroxide, ondansetron      Intake/Output Summary (Last 24 hours) at 6/30/2019 0834  Last data filed at 6/30/2019 0828  Gross per 24 hour   Intake 370 ml   Output 1500 ml   Net -1130 ml       Exam:    /67   Pulse 72   Temp 97.9 °F (36.6 °C) (Oral)   Resp 22   Ht 5' 8\" (1.727 m)   Wt 165 lb (74.8 kg)   SpO2 93%   BMI 25.09 kg/m²     General appearance: No apparent distress, appears stated age and cooperative. HEENT: Pupils equal, round, and reactive to light. Conjunctivae/corneas clear. Neck: Supple, with full range of motion. No jugular venous distention. Trachea midline. Respiratory:  Normal respiratory effort. Clear to auscultation, bilaterally without Rales/Wheezes/Rhonchi. Cardiovascular: Regular rate and rhythm with normal S1/S2 without murmurs, rubs or gallops. Abdomen: Soft, non-tender, non-distended with normal bowel sounds. Musculoskeletal:massive edema bilaterally. Full range of motion without deformity. Skin: Skin color, texture, turgor normal.  No rashes or lesions. Neurologic:  Neurovascularly intact without any focal sensory/motor deficits.  Cranial nerves: II-XII intact, grossly non-focal.  Psychiatric: Alert and oriented, thought content appropriate, normal insight  Capillary

## 2019-06-30 NOTE — PROGRESS NOTES
includes back surgery and Inguinal hernia repair (Bilateral). Restrictions  Restrictions/Precautions  Restrictions/Precautions: Fall Risk, General Precautions  Required Braces or Orthoses?: No  Subjective   General  Additional Pertinent Hx: On coumadin, Afib,   Family / Caregiver Present: Yes  Referring Practitioner: Dr. Tramaine Christian to Stand: Minimal Assistance; Moderate Assistance  Stand to sit: Minimal Assistance;Contact guard assistance  Ambulation  Ambulation?: Yes  Ambulation 1  Surface: level tile  Device: Rolling Walker  Other Apparatus: Wheelchair follow  Assistance: Contact guard assistance  Quality of Gait: flexed posture with good walker awareness  Gait Deviations: Slow Mary[place];Decreased step length  Distance: 40' x 2 with seated rest break between bouts  Comments: Verbal cues for posture, required short seated rest break between bouts of walking   Stairs/Curb  Stairs?: No     Balance  Posture: Fair  Sitting - Static: Good  Sitting - Dynamic: Fair  Standing - Static: Fair;-  Standing - Dynamic: Fair;-  Exercises  Quad Sets: x20 ea  Hip Flexion: seated marching x20 ea  Knee Long Arc Quad: x20 ea  Ankle Pumps: x20  Other exercises  Other exercises?: Yes  Other exercises 1: Iso pillow squeeze 5\"x20  Other exercises 2: seated HR/TR x20  Other exercises 3: Mini squat x5  Other exercises 4: sit <>stand x3       G-Code     OutComes Score   AM-PAC Score     Goals  Short term goals  Time Frame for Short term goals: 3-5 days  Short term goal 1: Pt to be I with transfers for improved safety  Short term goal 2: Pt to be I with bed mobility  Short term goal 3: Pt to demo 4+/5 strength B LE to facilitate improved functional mobility  Short term goal 4: Pt to demo good overall standing balance for decreased risk for falls  Short term goal 5: Pt to ambulate 100' with LRAD with improved safety  Long term goals  Time Frame for Long term goals : same as STG's; determine D/C plan  Patient Goals   Patient goals : none stated    Plan    Plan  Times per week: 5-7  Times per day: Daily(1-2x)  Plan weeks: 1 week  Current Treatment Recommendations: Strengthening, Transfer Training, Neuromuscular Re-education, ROM, ADL/Self-care Training, Balance Training, Gait Training, Home Exercise Program, Modalities, Safety Education & Training, Stair training, Functional Mobility Training  Safety Devices  Type of devices:  All fall risk precautions in place, Bed alarm in place, Call light within reach     Therapy Time   Individual Concurrent Group Co-treatment   Time In  1055         Time Out  1135           Minutes  [address] [place] [address]

## 2019-07-01 ENCOUNTER — HOSPITAL ENCOUNTER (INPATIENT)
Age: 84
LOS: 8 days | Discharge: HOME | DRG: 291 | End: 2019-07-09
Attending: INTERNAL MEDICINE | Admitting: INTERNAL MEDICINE
Payer: MEDICARE

## 2019-07-01 VITALS
HEART RATE: 68 BPM | HEIGHT: 68 IN | SYSTOLIC BLOOD PRESSURE: 106 MMHG | OXYGEN SATURATION: 94 % | DIASTOLIC BLOOD PRESSURE: 45 MMHG | WEIGHT: 165 LBS | BODY MASS INDEX: 25.01 KG/M2 | RESPIRATION RATE: 20 BRPM | TEMPERATURE: 97.7 F

## 2019-07-01 DIAGNOSIS — I50.9 CHF WITH UNKNOWN LVEF (HCC): Primary | ICD-10-CM

## 2019-07-01 LAB
ANION GAP SERPL CALCULATED.3IONS-SCNC: 12 MEQ/L (ref 9–15)
BUN BLDV-MCNC: 30 MG/DL (ref 8–23)
CALCIUM SERPL-MCNC: 8.9 MG/DL (ref 8.5–9.9)
CHLORIDE BLD-SCNC: 106 MEQ/L (ref 95–107)
CO2: 28 MEQ/L (ref 20–31)
CREAT SERPL-MCNC: 1.43 MG/DL (ref 0.7–1.2)
GFR AFRICAN AMERICAN: 56.2
GFR NON-AFRICAN AMERICAN: 46.5
GLUCOSE BLD-MCNC: 94 MG/DL (ref 70–99)
HEMOCCULT STL QL: NORMAL
INR BLD: 1.8
POTASSIUM SERPL-SCNC: 3.3 MEQ/L (ref 3.4–4.9)
PRO-BNP: NORMAL PG/ML
PROTHROMBIN TIME: 17.3 SEC (ref 9–11.5)
SODIUM BLD-SCNC: 146 MEQ/L (ref 135–144)

## 2019-07-01 PROCEDURE — 6370000000 HC RX 637 (ALT 250 FOR IP): Performed by: INTERNAL MEDICINE

## 2019-07-01 PROCEDURE — 97110 THERAPEUTIC EXERCISES: CPT

## 2019-07-01 PROCEDURE — 2580000003 HC RX 258: Performed by: INTERNAL MEDICINE

## 2019-07-01 PROCEDURE — 1200000002 HC SEMI PRIVATE SWING BED

## 2019-07-01 PROCEDURE — 97530 THERAPEUTIC ACTIVITIES: CPT

## 2019-07-01 PROCEDURE — 6360000002 HC RX W HCPCS: Performed by: INTERNAL MEDICINE

## 2019-07-01 PROCEDURE — 97116 GAIT TRAINING THERAPY: CPT

## 2019-07-01 PROCEDURE — 80048 BASIC METABOLIC PNL TOTAL CA: CPT

## 2019-07-01 PROCEDURE — 36415 COLL VENOUS BLD VENIPUNCTURE: CPT

## 2019-07-01 PROCEDURE — 83880 ASSAY OF NATRIURETIC PEPTIDE: CPT

## 2019-07-01 PROCEDURE — 85610 PROTHROMBIN TIME: CPT

## 2019-07-01 RX ORDER — FOLIC ACID 1 MG/1
1 TABLET ORAL DAILY
Status: DISCONTINUED | OUTPATIENT
Start: 2019-07-02 | End: 2019-07-09 | Stop reason: HOSPADM

## 2019-07-01 RX ORDER — SODIUM CHLORIDE 0.9 % (FLUSH) 0.9 %
10 SYRINGE (ML) INJECTION PRN
Status: CANCELLED | OUTPATIENT
Start: 2019-07-01

## 2019-07-01 RX ORDER — SENNA PLUS 8.6 MG/1
1 TABLET ORAL NIGHTLY
Status: CANCELLED | OUTPATIENT
Start: 2019-07-01

## 2019-07-01 RX ORDER — FUROSEMIDE 20 MG/1
20 TABLET ORAL DAILY
Status: CANCELLED | OUTPATIENT
Start: 2019-07-01

## 2019-07-01 RX ORDER — SODIUM CHLORIDE 0.9 % (FLUSH) 0.9 %
10 SYRINGE (ML) INJECTION EVERY 12 HOURS SCHEDULED
Status: DISCONTINUED | OUTPATIENT
Start: 2019-07-01 | End: 2019-07-03 | Stop reason: ALTCHOICE

## 2019-07-01 RX ORDER — ONDANSETRON 2 MG/ML
4 INJECTION INTRAMUSCULAR; INTRAVENOUS EVERY 6 HOURS PRN
Status: DISCONTINUED | OUTPATIENT
Start: 2019-07-01 | End: 2019-07-09 | Stop reason: HOSPADM

## 2019-07-01 RX ORDER — WARFARIN SODIUM 2 MG/1
2 TABLET ORAL
Status: DISCONTINUED | OUTPATIENT
Start: 2019-07-01 | End: 2019-07-01 | Stop reason: HOSPADM

## 2019-07-01 RX ORDER — ANAGRELIDE 0.5 MG/1
1 CAPSULE ORAL 2 TIMES DAILY
Status: CANCELLED | OUTPATIENT
Start: 2019-07-01

## 2019-07-01 RX ORDER — POTASSIUM CHLORIDE 20MEQ/15ML
40 LIQUID (ML) ORAL ONCE
Status: COMPLETED | OUTPATIENT
Start: 2019-07-01 | End: 2019-07-01

## 2019-07-01 RX ORDER — WARFARIN SODIUM 2 MG/1
2 TABLET ORAL
Status: COMPLETED | OUTPATIENT
Start: 2019-07-01 | End: 2019-07-01

## 2019-07-01 RX ORDER — ONDANSETRON 2 MG/ML
4 INJECTION INTRAMUSCULAR; INTRAVENOUS EVERY 6 HOURS PRN
Status: CANCELLED | OUTPATIENT
Start: 2019-07-01

## 2019-07-01 RX ORDER — DOCUSATE SODIUM 100 MG/1
100 CAPSULE, LIQUID FILLED ORAL 2 TIMES DAILY
Status: CANCELLED | OUTPATIENT
Start: 2019-07-01

## 2019-07-01 RX ORDER — POLYETHYLENE GLYCOL 3350 17 G/17G
17 POWDER, FOR SOLUTION ORAL DAILY
Status: DISCONTINUED | OUTPATIENT
Start: 2019-07-02 | End: 2019-07-04

## 2019-07-01 RX ORDER — DOCUSATE SODIUM 100 MG/1
100 CAPSULE, LIQUID FILLED ORAL 2 TIMES DAILY
Status: DISCONTINUED | OUTPATIENT
Start: 2019-07-01 | End: 2019-07-04

## 2019-07-01 RX ORDER — FERROUS SULFATE 325(65) MG
325 TABLET ORAL
Status: DISCONTINUED | OUTPATIENT
Start: 2019-07-02 | End: 2019-07-09 | Stop reason: HOSPADM

## 2019-07-01 RX ORDER — FOLIC ACID 1 MG/1
1 TABLET ORAL DAILY
Status: CANCELLED | OUTPATIENT
Start: 2019-07-01

## 2019-07-01 RX ORDER — POTASSIUM CHLORIDE 750 MG/1
40 TABLET, EXTENDED RELEASE ORAL ONCE
Status: DISCONTINUED | OUTPATIENT
Start: 2019-07-01 | End: 2019-07-01

## 2019-07-01 RX ORDER — SENNA PLUS 8.6 MG/1
1 TABLET ORAL NIGHTLY
Status: DISCONTINUED | OUTPATIENT
Start: 2019-07-01 | End: 2019-07-04

## 2019-07-01 RX ORDER — ANAGRELIDE 0.5 MG/1
1 CAPSULE ORAL 2 TIMES DAILY
Status: DISCONTINUED | OUTPATIENT
Start: 2019-07-01 | End: 2019-07-09 | Stop reason: HOSPADM

## 2019-07-01 RX ORDER — POLYETHYLENE GLYCOL 3350 17 G/17G
17 POWDER, FOR SOLUTION ORAL DAILY
Status: CANCELLED | OUTPATIENT
Start: 2019-07-01

## 2019-07-01 RX ORDER — POTASSIUM CHLORIDE 20MEQ/15ML
20 LIQUID (ML) ORAL DAILY
Status: DISCONTINUED | OUTPATIENT
Start: 2019-07-02 | End: 2019-07-09 | Stop reason: HOSPADM

## 2019-07-01 RX ORDER — FERROUS SULFATE 325(65) MG
325 TABLET ORAL
Status: CANCELLED | OUTPATIENT
Start: 2019-07-01

## 2019-07-01 RX ORDER — SODIUM CHLORIDE 0.9 % (FLUSH) 0.9 %
10 SYRINGE (ML) INJECTION EVERY 12 HOURS SCHEDULED
Status: CANCELLED | OUTPATIENT
Start: 2019-07-01

## 2019-07-01 RX ORDER — SODIUM CHLORIDE 0.9 % (FLUSH) 0.9 %
10 SYRINGE (ML) INJECTION PRN
Status: DISCONTINUED | OUTPATIENT
Start: 2019-07-01 | End: 2019-07-09 | Stop reason: HOSPADM

## 2019-07-01 RX ORDER — FUROSEMIDE 20 MG/1
20 TABLET ORAL DAILY
Status: DISCONTINUED | OUTPATIENT
Start: 2019-07-01 | End: 2019-07-09 | Stop reason: HOSPADM

## 2019-07-01 RX ORDER — POTASSIUM CHLORIDE 20MEQ/15ML
20 LIQUID (ML) ORAL DAILY
Status: CANCELLED | OUTPATIENT
Start: 2019-07-01

## 2019-07-01 RX ADMIN — Medication 10 ML: at 19:55

## 2019-07-01 RX ADMIN — POTASSIUM CHLORIDE 20 MEQ: 20 SOLUTION ORAL at 09:21

## 2019-07-01 RX ADMIN — DOCUSATE SODIUM 100 MG: 100 CAPSULE, LIQUID FILLED ORAL at 19:53

## 2019-07-01 RX ADMIN — POTASSIUM CHLORIDE 40 MEQ: 20 SOLUTION ORAL at 09:19

## 2019-07-01 RX ADMIN — SACUBITRIL AND VALSARTAN 1 TABLET: 24; 26 TABLET, FILM COATED ORAL at 19:53

## 2019-07-01 RX ADMIN — ANAGRELIDE 1 MG: 0.5 CAPSULE ORAL at 19:54

## 2019-07-01 RX ADMIN — METOPROLOL TARTRATE 25 MG: 25 TABLET ORAL at 09:19

## 2019-07-01 RX ADMIN — SENNOSIDES 8.6 MG: 8.6 TABLET, FILM COATED ORAL at 19:53

## 2019-07-01 RX ADMIN — FOLIC ACID 1 MG: 1 TABLET ORAL at 09:19

## 2019-07-01 RX ADMIN — Medication 10 ML: at 09:20

## 2019-07-01 RX ADMIN — METOPROLOL TARTRATE 25 MG: 25 TABLET ORAL at 19:53

## 2019-07-01 RX ADMIN — WARFARIN SODIUM 2 MG: 2 TABLET ORAL at 18:22

## 2019-07-01 RX ADMIN — FUROSEMIDE 20 MG: 10 INJECTION, SOLUTION INTRAMUSCULAR; INTRAVENOUS at 09:19

## 2019-07-01 RX ADMIN — SACUBITRIL AND VALSARTAN 1 TABLET: 24; 26 TABLET, FILM COATED ORAL at 09:19

## 2019-07-01 RX ADMIN — FERROUS SULFATE TAB 325 MG (65 MG ELEMENTAL FE) 325 MG: 325 (65 FE) TAB at 09:19

## 2019-07-01 RX ADMIN — ANAGRELIDE 1 MG: 0.5 CAPSULE ORAL at 09:18

## 2019-07-01 ASSESSMENT — PAIN SCALES - GENERAL
PAINLEVEL_OUTOF10: 0

## 2019-07-01 ASSESSMENT — ENCOUNTER SYMPTOMS
APNEA: 0
COLOR CHANGE: 0
CHEST TIGHTNESS: 0
SHORTNESS OF BREATH: 0

## 2019-07-01 NOTE — PROGRESS NOTES
Consult Note  Patient: Glenys [facility]  Unit/Bed:  0206/0206-01  YOB: 1929  MRN: [id number]  Acct: [de-identified]   Admitting Diagnosis: CHF with unknown LVEF ([facility] [I50.9]  Admit Date:  6/28/2019  Hospital Day: 3      Chief Complaint:  CP.H/O DVT and coumadin. Dr Verner Makos primary. History of Present Illness:  Admitted with acute blood loss anemia. Coumadin held for few days. Received PC    Allergies   Allergen Reactions    Aspirin Anaphylaxis    Nuts [Peanut-Containing Drug Products]        Current Facility-Administered Medications   Medication Dose Route Frequency Provider Last Rate Last Dose    warfarin (COUMADIN) daily dosing (placeholder)   Other RX Malorie Lucia MD        sacubitril-valsartan (ENTRESTO) 24-26 MG per tablet 1 tablet  1 tablet Oral BID Quinn Nicholas MD   1 tablet at 07/01/19 0919    potassium chloride 20 MEQ/15ML (10%) oral solution 20 mEq  20 mEq Oral Daily Peng Sam, DO   20 mEq at 07/01/19 4701    furosemide (LASIX) injection 20 mg  20 mg Intravenous BID Peng Sam, DO   20 mg at 07/01/19 0919    polyethylene glycol (GLYCOLAX) packet 17 g  17 g Oral Daily Quinn Nicholas MD   17 g at 06/30/19 0816    docusate sodium (COLACE) capsule 100 mg  100 mg Oral BID Quinn Nicholas MD   100 mg at 06/30/19 2010    senna (SENOKOT) tablet 8.6 mg  1 tablet Oral Nightly Quinn Nicholas MD   8.6 mg at 06/30/19 2010    sodium chloride flush 0.9 % injection 10 mL  10 mL Intravenous 2 times per day Quinn Nicholas MD   10 mL at 07/01/19 0920    sodium chloride flush 0.9 % injection 10 mL  10 mL Intravenous PRN Quinn Nicholas MD        magnesium hydroxide (MILK OF MAGNESIA) 400 MG/5ML suspension 30 mL  30 mL Oral Daily PRN Quinn Nicholas MD        ondansetron [facility]) injection 4 mg  4 mg Intravenous Q6H PRN Quinn Nicholas MD        anagrelide [facility] [place]) capsule 1 mg  1 mg Oral BID Quinn Nicholas MD   1 mg at 07/01/19 7602    ferrous sulfate tablet 325 mg  325 mg Oral Daily with breakfast Tesha Delcid Other Topics Concern    None   Social History Narrative    None       Family Hx:  Family History   Problem Relation Age of Onset    Thyroid Disease Mother     Heart Attack Father     No Known Problems Sister     No Known Problems Brother     No Known Problems Maternal Aunt     No Known Problems Maternal Uncle     No Known Problems Paternal Aunt     No Known Problems Paternal Uncle     No Known Problems Maternal Grandmother     No Known Problems Maternal Grandfather     No Known Problems Paternal Grandmother     No Known Problems Paternal Grandfather     No Known Problems Other        Review of Systems:   Review of Systems   Constitutional: Negative for appetite change, diaphoresis and fatigue. Respiratory: Negative for apnea, chest tightness and shortness of breath. Cardiovascular: Negative for chest pain, palpitations and leg swelling. Skin: Negative for color change, pallor, rash and wound. Neurological: Negative for dizziness, syncope, weakness, light-headedness and headaches. Psychiatric/Behavioral: Negative for agitation, behavioral problems and confusion. The patient is not nervous/anxious and is not hyperactive. Physical Examination:    /67   Pulse 77   Temp 97.5 °F (36.4 °C)   Resp 22   Ht 5' 8\" (1.727 m)   Wt 165 lb (74.8 kg)   SpO2 93%   BMI 25.09 kg/m²    Physical Exam   Constitutional: He is oriented to person, place, and time. He appears well-developed and well-nourished. HENT:   Head: Atraumatic. Eyes: Pupils are equal, round, and reactive to light. Conjunctivae are normal.   Neck: No JVD present. No tracheal deviation present. No thyromegaly present. Cardiovascular: Normal rate, regular rhythm and intact distal pulses. Exam reveals no gallop and no friction rub. No murmur heard. Pulmonary/Chest: No respiratory distress. He has no wheezes. He has no rales. He exhibits no tenderness. Abdominal: Soft. He exhibits no distension and no mass.

## 2019-07-01 NOTE — DISCHARGE SUMMARY
STOOL SCREEN #1   Result Value Ref Range    OCCULT BLOOD FECAL Negative  Normal Range:Negative      CBC   Result Value Ref Range    WBC 6.4 4.8 - 10.8 K/uL    RBC 2.79 (L) 4.70 - 6.10 M/uL    Hemoglobin 8.9 (L) 14.0 - 18.0 g/dL    Hematocrit 26.9 (L) 42.0 - 52.0 %    MCV 96.6 80.0 - 100.0 fL    MCH 31.8 (H) 27.0 - 31.3 pg    MCHC 32.9 (L) 33.0 - 37.0 %    RDW 21.1 (H) 11.5 - 14.5 %    Platelets 758 678 - 974 K/uL   Basic Metabolic Panel w/ Reflex to MG   Result Value Ref Range    Sodium 146 (H) 135 - 144 mEq/L    Potassium reflex Magnesium 3.0 (LL) 3.4 - 4.9 mEq/L    Chloride 105 95 - 107 mEq/L    CO2 26 20 - 31 mEq/L    Anion Gap 15 9 - 15 mEq/L    Glucose 91 70 - 99 mg/dL    BUN 26 (H) 8 - 23 mg/dL    CREATININE 1.52 (H) 0.70 - 1.20 mg/dL    GFR Non- 43.3 (L) >60    GFR  52.4 (L) >60    Calcium 9.0 8.5 - 9.9 mg/dL   Protime-INR   Result Value Ref Range    Protime 19.0 (H) 9.0 - 11.5 sec    INR 2.0    BLOOD OCCULT STOOL SCREEN #1   Result Value Ref Range    OCCULT BLOOD FECAL Negative  Normal Range:Negative      Brain Natriuretic Peptide   Result Value Ref Range    Pro-BNP 21,071 pg/mL   Protime-INR   Result Value Ref Range    Protime 16.9 (H) 9.0 - 11.5 sec    INR 1.8    Basic Metabolic Panel   Result Value Ref Range    Sodium 145 (H) 135 - 144 mEq/L    Potassium 3.3 (L) 3.4 - 4.9 mEq/L    Chloride 106 95 - 107 mEq/L    CO2 24 20 - 31 mEq/L    Anion Gap 15 9 - 15 mEq/L    Glucose 89 70 - 99 mg/dL    BUN 28 (H) 8 - 23 mg/dL    CREATININE 1.44 (H) 0.70 - 1.20 mg/dL    GFR Non-African American 46.1 (L) >60    GFR  55.8 (L) >60    Calcium 8.9 8.5 - 9.9 mg/dL   CBC Auto Differential   Result Value Ref Range    WBC 6.3 4.8 - 10.8 K/uL    RBC 2.90 (L) 4.70 - 6.10 M/uL    Hemoglobin 9.3 (L) 14.0 - 18.0 g/dL    Hematocrit 28.4 (L) 42.0 - 52.0 %    MCV 98.1 80.0 - 100.0 fL    MCH 32.0 (H) 27.0 - 31.3 pg    MCHC 32.6 (L) 33.0 - 37.0 %    RDW 20.8 (H) 11.5 - 14.5 %    Platelets 254 130 - 400 K/uL    Neutrophils % 83.6 %    Lymphocytes % 9.4 %    Monocytes % 5.7 %    Eosinophils % 0.9 %    Basophils % 0.4 %    Neutrophils # 5.3 1.4 - 6.5 K/uL    Lymphocytes # 0.6 (L) 1.0 - 4.8 K/uL    Monocytes # 0.4 0.2 - 0.8 K/uL    Eosinophils # 0.1 0.0 - 0.7 K/uL    Basophils # 0.0 0.0 - 0.2 K/uL    Anisocytosis PRESENT    Protime-INR   Result Value Ref Range    Protime 17.3 (H) 9.0 - 11.5 sec    INR 1.8    Basic Metabolic Panel   Result Value Ref Range    Sodium 146 (H) 135 - 144 mEq/L    Potassium 3.3 (L) 3.4 - 4.9 mEq/L    Chloride 106 95 - 107 mEq/L    CO2 28 20 - 31 mEq/L    Anion Gap 12 9 - 15 mEq/L    Glucose 94 70 - 99 mg/dL    BUN 30 (H) 8 - 23 mg/dL    CREATININE 1.43 (H) 0.70 - 1.20 mg/dL    GFR Non-African American 46.5 (L) >60    GFR  56.2 (L) >60    Calcium 8.9 8.5 - 9.9 mg/dL   Brain Natriuretic Peptide   Result Value Ref Range    Pro-BNP 14,433 pg/mL   EKG 12 Lead - Chest Pain   Result Value Ref Range    Ventricular Rate 90 BPM    Atrial Rate 77 BPM    QRS Duration 108 ms    Q-T Interval 394 ms    QTc Calculation (Bazett) 481 ms    R Axis -39 degrees    T Axis 128 degrees   TYPE AND SCREEN   Result Value Ref Range    ABO/Rh B POS     Antibody Screen NEG    PREPARE RBC (CROSSMATCH), 2 Units   Result Value Ref Range    Product Code Blood Bank [id number]     Description Blood Bank Red Blood Cells, Leuko-reduced     Unit Number [id number]     Dispense Status Blood Bank transfused     Product Code Blood Bank [id number]     Description Blood Bank Red Blood Cells, Leuko-reduced     Unit Number [id number]     Dispense Status Blood Bank transfused    ABO/RH   Result Value Ref Range    ABO/Rh B POS        Diet:  DIET GENERAL;     Activity:  Activity as tolerated (Patient may move about with assist as indicated or with supervision.)      Disposition: skilled     Condition: Stable    Time Spent: 50 minutes    Electronically signed by Quinn Nicholas MD on 7/1/2019 at 7:31 AM    Discharging Hospitalist

## 2019-07-01 NOTE — PROGRESS NOTES
Physical Therapy  Facility/Department: [facility] MED SURG UNIT  Daily Treatment Note  NAME: Smita Nagy  : 1929  MRN: [id number]    Date of Service: 2019    Discharge Recommendations:  Continue to assess pending progress, Patient would benefit from continued therapy after discharge        Assessment   Body structures, Functions, Activity limitations: Decreased functional mobility ; Decreased strength;Decreased endurance;Decreased safe awareness;Decreased balance  Assessment: Pt performed 50' of ambulation with WC follow and CGA with VC's to correct posture. Pt very pleasent and responds well to instruction for seated Ther EX and able to perform all seated Ther Ex given. Treatment Diagnosis: Weakness, SOB, LE edema, hx of falls  Prognosis: Good  Patient Education: Pt educated on safety with transfers with good follow through. REQUIRES PT FOLLOW UP: Yes  Activity Tolerance  Activity Tolerance: Patient limited by fatigue;Patient limited by endurance     Patient Diagnosis(es): The primary encounter diagnosis was Peripheral edema. Diagnoses of Congestive heart failure, unspecified HF chronicity, unspecified heart failure type ([address] Chronic atrial fibrillation ([address] Chronic anemia, and DNR (do not resuscitate) were also pertinent to this visit. has a past medical history of Anxiety disorder, Arthritis, Carbajal's esophagus, Cataract, DVT (deep venous thrombosis) ([address] GERD (gastroesophageal reflux disease), HTN (hypertension), Prostate enlargement, Pulmonary embolism ([address] and Type 2 diabetes mellitus ([address] has a past surgical history that includes back surgery and Inguinal hernia repair (Bilateral). Restrictions  Restrictions/Precautions  Restrictions/Precautions: Fall Risk, General Precautions  Required Braces or Orthoses?: No  Subjective   General  Chart Reviewed: Yes  Additional Pertinent Hx:  On coumadin, Afib,   Response To Previous Treatment: Not applicable  Family / Caregiver Present:

## 2019-07-01 NOTE — PROGRESS NOTES
Physical Therapy  Facility/Department: [facility] MED SURG UNIT  Daily Treatment Note  NAME: Maritza Ye  : 1929  MRN: [id number]    Date of Service: 2019    Discharge Recommendations:  Home with Home health PT        Assessment   Body structures, Functions, Activity limitations: Decreased functional mobility ; Decreased strength;Decreased endurance;Decreased safe awareness;Decreased balance  Assessment: Pt ambulated to bathroom with several standing balance trials for pericare F- with BUE assist on [facility] and 0 LOB. Pt then ambulated 50' with standing RB and then was able to ambulate 10 more ft but was limited by fatigue. Pt performed seated LE ther ex with frequent RB's. O2 and HR fluctuating but WNL's. Pt then performed stairs this date but was unclear with dtr in law present how home set up was. Pt returned to room to sit up in recliner with call light and chair alarm in place. Continue to progress as gunnar. Treatment Diagnosis: Weakness, SOB, LE edema, hx of falls  REQUIRES PT FOLLOW UP: Yes  Activity Tolerance  Activity Tolerance: Patient limited by fatigue;Patient limited by endurance     Patient Diagnosis(es): The primary encounter diagnosis was Peripheral edema. Diagnoses of Congestive heart failure, unspecified HF chronicity, unspecified heart failure type ([address] Chronic atrial fibrillation ([address] Chronic anemia, and DNR (do not resuscitate) were also pertinent to this visit. has a past medical history of Anxiety disorder, Arthritis, Carbajal's esophagus, Cataract, DVT (deep venous thrombosis) ([address] GERD (gastroesophageal reflux disease), HTN (hypertension), Prostate enlargement, Pulmonary embolism ([address] and Type 2 diabetes mellitus ([address] has a past surgical history that includes back surgery and Inguinal hernia repair (Bilateral).     Restrictions  Restrictions/Precautions  Restrictions/Precautions: Fall Risk, General Precautions  Required Braces or Orthoses?: No  Subjective   General  Chart Reviewed: Yes  Additional Pertinent Hx: On coumadin, Afib,   Response To Previous Treatment: Not applicable  Family / Caregiver Present: Yes  Referring Practitioner: Dr. Tulio Lira  Subjective  Subjective: Pt sitting up in recliner and requesting use the bathroom. Pt agreeable to therapy session. Pain Screening  Patient Currently in Pain: No  Vital Signs  Patient Currently in Pain: No       Orientation     Cognition      Objective      Transfers  Sit to Stand: Contact guard assistance  Stand to sit: Contact guard assistance  Ambulation  Ambulation?: Yes  Ambulation 1  Surface: level tile  Device: Rolling Walker  Assistance: Contact guard assistance  Distance: 10' with seated RB and then 48' and 10' with standing RB. 1 retro LOB with inc fatigue with minAx1 to correct. Stairs/Curb  Stairs?: Yes  Stairs  # Steps : 3  Stairs Height: 4\"  Rails: Bilateral  Assistance: Minimal assistance;Contact guard assistance  Comment: pt performed steps non-reciprocally ascending/descending. pt had retro LOB upon descent requiring minAx1 for correction. Pt reports no fatigue post but had inc in SOB. Exercises  Hip Flexion: x 20 seated   Knee Long Arc Quad: x 20 seated   Ankle Pumps: x 20 seated  Comments: O2 fluctuating from 91% to 96% on room air and HR fluctuating from 90's to 130 BPM throughout session.                          G-Code     OutComes Score                                                     AM-PAC Score             Goals  Short term goals  Time Frame for Short term goals: 3-5 days  Short term goal 1: Pt to be I with transfers for improved safety  Short term goal 2: Pt to be I with bed mobility  Short term goal 3: Pt to demo 4+/5 strength B LE to facilitate improved functional mobility  Short term goal 4: Pt to demo good overall standing balance for decreased risk for falls  Short term goal 5: Pt to ambulate 100' with LRAD with improved safety  Long term goals  Time Frame for Long term goals : same as STG's;

## 2019-07-02 LAB
INR BLD: 1.7
PROTHROMBIN TIME: 16.7 SEC (ref 9–11.5)

## 2019-07-02 PROCEDURE — 97162 PT EVAL MOD COMPLEX 30 MIN: CPT

## 2019-07-02 PROCEDURE — 6370000000 HC RX 637 (ALT 250 FOR IP): Performed by: INTERNAL MEDICINE

## 2019-07-02 PROCEDURE — 97535 SELF CARE MNGMENT TRAINING: CPT

## 2019-07-02 PROCEDURE — 97116 GAIT TRAINING THERAPY: CPT

## 2019-07-02 PROCEDURE — 97166 OT EVAL MOD COMPLEX 45 MIN: CPT

## 2019-07-02 PROCEDURE — 85610 PROTHROMBIN TIME: CPT

## 2019-07-02 PROCEDURE — 1200000002 HC SEMI PRIVATE SWING BED

## 2019-07-02 PROCEDURE — 36415 COLL VENOUS BLD VENIPUNCTURE: CPT

## 2019-07-02 PROCEDURE — 97110 THERAPEUTIC EXERCISES: CPT

## 2019-07-02 PROCEDURE — 97530 THERAPEUTIC ACTIVITIES: CPT

## 2019-07-02 RX ORDER — WARFARIN SODIUM 4 MG/1
4 TABLET ORAL ONCE
Status: COMPLETED | OUTPATIENT
Start: 2019-07-02 | End: 2019-07-02

## 2019-07-02 RX ADMIN — SACUBITRIL AND VALSARTAN 1 TABLET: 24; 26 TABLET, FILM COATED ORAL at 09:12

## 2019-07-02 RX ADMIN — METOPROLOL TARTRATE 25 MG: 25 TABLET ORAL at 09:12

## 2019-07-02 RX ADMIN — ANAGRELIDE 1 MG: 0.5 CAPSULE ORAL at 20:19

## 2019-07-02 RX ADMIN — SACUBITRIL AND VALSARTAN 1 TABLET: 24; 26 TABLET, FILM COATED ORAL at 20:17

## 2019-07-02 RX ADMIN — ANAGRELIDE 1 MG: 0.5 CAPSULE ORAL at 09:12

## 2019-07-02 RX ADMIN — FOLIC ACID 1 MG: 1 TABLET ORAL at 09:12

## 2019-07-02 RX ADMIN — FUROSEMIDE 20 MG: 20 TABLET ORAL at 09:12

## 2019-07-02 RX ADMIN — METOPROLOL TARTRATE 25 MG: 25 TABLET ORAL at 20:17

## 2019-07-02 RX ADMIN — FERROUS SULFATE TAB 325 MG (65 MG ELEMENTAL FE) 325 MG: 325 (65 FE) TAB at 09:12

## 2019-07-02 RX ADMIN — WARFARIN SODIUM 4 MG: 4 TABLET ORAL at 17:28

## 2019-07-02 RX ADMIN — DOCUSATE SODIUM 100 MG: 100 CAPSULE, LIQUID FILLED ORAL at 20:18

## 2019-07-02 RX ADMIN — SENNOSIDES 8.6 MG: 8.6 TABLET, FILM COATED ORAL at 20:18

## 2019-07-02 RX ADMIN — POTASSIUM CHLORIDE 20 MEQ: 20 SOLUTION ORAL at 09:12

## 2019-07-02 ASSESSMENT — PAIN SCALES - GENERAL
PAINLEVEL_OUTOF10: 0

## 2019-07-02 NOTE — PROGRESS NOTES
Physical Therapy  Facility/Department: [facility] MED SURG UNIT  Daily Treatment Note  NAME: Glenys Del Toro  : 1929  MRN: [id number]    Date of Service: 2019    Discharge Recommendations:  Home with Home health PT, Home with assist PRN        Assessment   Body structures, Functions, Activity limitations: Decreased functional mobility ; Decreased strength;Decreased endurance;Decreased balance;Decreased safe awareness  Assessment: Pt performed supine and seated exercises with mild fatigue. Pt ambulated with ww with slow pace and 1 standing rest break CGA. Min A with ww in tight places and vcs sto stay close to ww. Treatment Diagnosis: dififcutly walking , LE weakness, decreased function  Prognosis: Good  REQUIRES PT FOLLOW UP: Yes  Activity Tolerance  Activity Tolerance: Patient Tolerated treatment well     Patient Diagnosis(es): There were no encounter diagnoses. has a past medical history of Anxiety disorder, Arthritis, Carbajal's esophagus, Cataract, DVT (deep venous thrombosis) ([address] GERD (gastroesophageal reflux disease), HTN (hypertension), Prostate enlargement, Pulmonary embolism ([address] and Type 2 diabetes mellitus ([facility] [address] has a past surgical history that includes back surgery and Inguinal hernia repair (Bilateral). Restrictions  Restrictions/Precautions  Restrictions/Precautions: Fall Risk  Required Braces or Orthoses?: No  Subjective   General  Chart Reviewed: Yes  Additional Pertinent Hx:  On coumadin, Afib,   Family / Caregiver Present: No  Referring Practitioner: Dr. Aletha Zarate  Subjective  Subjective: Pt lying in bed upon arrival and agreeable to therapy  Pain Screening  Patient Currently in Pain: No  Vital Signs  BP Location: Left upper arm  Level of Consciousness: Alert  Patient Currently in Pain: No  Oxygen Therapy  O2 Device: None (Room air)       Objective      Transfers  Sit to Stand: Contact guard assistance(ww)  Stand to sit: Contact guard assistance(ww)  Bed to Chair: Contact guard Evaluation, Education, & procurement     Therapy Time   Individual Concurrent Group Co-treatment   Time In  215         Time Out  436 [facility]  License and [address] Number: [id number]

## 2019-07-02 NOTE — PROGRESS NOTES
and daughter in law next door)  Type of Home: House  Home Layout: Two level, Performs ADL's on one level, Able to Live on Main level with bedroom/bathroom(lives in basement with bathroom, kitchen, walk in shower, )  Home Access: Stairs to enter without rails  Entrance Stairs - Number of Steps: (11 stairs to basement/ bedroom with 1 rail on L up )  Bathroom Shower/Tub: Walk-in shower, Shower chair with back  [employer]: Handicap height  Bathroom Equipment: Toilet raiser  Bathroom Accessibility: Walker accessible  Home Equipment: Rolling walker, 4 wheeled walker, [address] Help From: Family  ADL Assistance: Independent  Homemaking Assistance: Needs assistance  Ambulation Assistance: Independent(4 wheeled ROllator, 3 wheel walker on main floor)  Transfer Assistance: Independent  Vocational: Retired  Leisure: Hobbies - Yes (comment)  Active : No  Occupation: Retired  Type of occupation: [employer],   Leisure & Hobbies: Puts puzzles together       Objective   Vision: Impaired  Vision Exceptions: Wears glasses at all times  Hearing: Exceptions to [facility]  Hearing Exceptions: Bilateral hearing aid    Orientation  Overall Orientation Status: Within Normal Limits  Observation/Palpation  Posture: Good  Palpation: denies pain with palpation to lower extremities. Observation: swelling and redness reduced in RLE, although minimal to moderate edema still present R>L   Functional Mobility  Functional - Mobility Device: Rolling Walker  Activity: To/from bathroom  Assist Level: Contact guard assistance  Shower Transfers  Shower - Transfer From: Will Roles - Transfer Type: To and From(use of grab bars, cues for safety/sequencing)  Shower - Transfer To:  Shower seat with back  Shower - Technique: Sit pivot  Shower Transfers: Contact Guard;Minimal assistance  ADL  Feeding: Setup;Modified independent   LE Bathing: Setup;Supervision  UE Dressing: Supervision;Stand by assistance  Toileting: Minimal

## 2019-07-02 NOTE — PROGRESS NOTES
assess full flight of stairs ( 10) with 1 rail and <= Min A of 1  Short term goal 4: tolerate 2x10 LE seated CLIVE  Short term goal 5: increase balance any amount to achieve function  Long term goals  Time Frame for Long term goals : 7-10 days   Long term goal 1: transfers and bed mobiilty modified independent  Long term goal 2: amb >= 150ft with ww or Rollator modified independent  Long term goal 3: 10 steps with 1 rail safe <= CS  Long term goal 4: increase LE strength and balance any amount to achieve functional goals   Long term goal 5: HEP in place for discharge  Patient Goals   Patient goals : \"I would like to go home. How soon can I go home? \" Family wants pt steadier with ww so decreased/no  falls at home.        Therapy Time   Individual Concurrent Group Co-treatment   Time In  1020         Time Out  1105         Minutes  Maryan [id number]

## 2019-07-03 LAB
INR BLD: 1.8
PROTHROMBIN TIME: 16.9 SEC (ref 9–11.5)

## 2019-07-03 PROCEDURE — 97116 GAIT TRAINING THERAPY: CPT

## 2019-07-03 PROCEDURE — 85610 PROTHROMBIN TIME: CPT

## 2019-07-03 PROCEDURE — 1200000002 HC SEMI PRIVATE SWING BED

## 2019-07-03 PROCEDURE — 97535 SELF CARE MNGMENT TRAINING: CPT

## 2019-07-03 PROCEDURE — 97110 THERAPEUTIC EXERCISES: CPT

## 2019-07-03 PROCEDURE — 36415 COLL VENOUS BLD VENIPUNCTURE: CPT

## 2019-07-03 PROCEDURE — 6370000000 HC RX 637 (ALT 250 FOR IP): Performed by: INTERNAL MEDICINE

## 2019-07-03 PROCEDURE — 97530 THERAPEUTIC ACTIVITIES: CPT

## 2019-07-03 RX ADMIN — ANAGRELIDE 1 MG: 0.5 CAPSULE ORAL at 09:27

## 2019-07-03 RX ADMIN — SACUBITRIL AND VALSARTAN 1 TABLET: 24; 26 TABLET, FILM COATED ORAL at 21:12

## 2019-07-03 RX ADMIN — METOPROLOL TARTRATE 25 MG: 25 TABLET ORAL at 09:25

## 2019-07-03 RX ADMIN — FERROUS SULFATE TAB 325 MG (65 MG ELEMENTAL FE) 325 MG: 325 (65 FE) TAB at 09:25

## 2019-07-03 RX ADMIN — FUROSEMIDE 20 MG: 20 TABLET ORAL at 09:25

## 2019-07-03 RX ADMIN — POTASSIUM CHLORIDE 20 MEQ: 20 SOLUTION ORAL at 09:25

## 2019-07-03 RX ADMIN — WARFARIN SODIUM 2.5 MG: 2 TABLET ORAL at 18:13

## 2019-07-03 RX ADMIN — ANAGRELIDE 1 MG: 0.5 CAPSULE ORAL at 21:13

## 2019-07-03 RX ADMIN — SACUBITRIL AND VALSARTAN 1 TABLET: 24; 26 TABLET, FILM COATED ORAL at 09:25

## 2019-07-03 RX ADMIN — METOPROLOL TARTRATE 25 MG: 25 TABLET ORAL at 21:12

## 2019-07-03 RX ADMIN — FOLIC ACID 1 MG: 1 TABLET ORAL at 09:25

## 2019-07-03 ASSESSMENT — PAIN SCALES - GENERAL: PAINLEVEL_OUTOF10: 0

## 2019-07-03 NOTE — PROGRESS NOTES
Occupational Therapy  Facility/Department: [facility] MED SURG UNIT  Daily Treatment Note  NAME: Dorie Harper  : 1929  MRN: [id number]    Date of Service: 7/3/2019    Discharge Recommendations:  Home with assist PRN, Home with Home health OT       Assessment      REQUIRES OT FOLLOW UP: Yes       Pt. Is agreeable to OT  Pt. Appears agitated, pt. Started getting loud and cutting people off while they were talking  Pt. Needs vc to slow down and for safety  Pt. Is impulsive  Patient Diagnosis(es): There were no encounter diagnoses. has a past medical history of Anxiety disorder, Arthritis, Carbajal's esophagus, Cataract, DVT (deep venous thrombosis) ([address] GERD (gastroesophageal reflux disease), HTN (hypertension), Prostate enlargement, Pulmonary embolism ([address] and Type 2 diabetes mellitus ([address] has a past surgical history that includes back surgery and Inguinal hernia repair (Bilateral). Restrictions  Restrictions/Precautions  Restrictions/Precautions: Fall Risk  Required Braces or Orthoses?: No  Subjective   General  Chart Reviewed: Yes  Patient assessed for rehabilitation services? : (in subacute as of 19)  Family / Caregiver Present: No  Referring Practitioner: Dr. Zuleika Montoya  Diagnosis: PE, CHF, BLE edema, weakness, and several falls  Subjective  Subjective: Pt. Is agreeable to OT      Orientation     Objective    ADL  LE Dressing: Minimal assistance(holding onto pt. and pulling up pants)  Toileting: Minimal assistance(/CGA with vc backside)   Toilet transfer-CGA with max vc for safety and to slow down  Pt.  Is impulsive and just jumped up out of seat without ww and stated he needs to use the restroom                                                           Type of ROM/Therapeutic Exercise  Type of ROM/Therapeutic Exercise: AROM with BUE in all planes and directions  Exercises  Shoulder Flexion: 15  Shoulder Extension: 15  Shoulder ABduction: 15  Shoulder ADduction: 15  Horizontal ABduction: 15  Horizontal ADduction: 15  Elbow Flexion: 15  Elbow Extension: 15  Wrist Flexion: 15  Wrist Extension: 15   To increase and maintain UB strength                     Plan   Plan  Times per week: 3-6x/wk  Times per day: Daily  Plan weeks: 1-2 wks  Current Treatment Recommendations: Strengthening, ROM, Balance Training, Functional Mobility Training, Endurance Training, Stair training, Safety Education & Training, Patient/Caregiver Education & Training, Self-Care / ADL, Home Management Training  G-Code     OutComes Score                                                  AM-PAC Score             Goals  Short term goals  Time Frame for Short term goals: 5-7 days  Short term goal 1: Tolerate 25-30min BUE ther ex/act to increase strength/end for ADL  Short term goal 2: Increase to SBA/Spv toileting  Short term goal 3: Increase to SBA/Spv LB dressing and bathing  Short term goal 4: I/MI G/H ADL  Long term goals  Time Frame for Long term goals : 7-10 days  Long term goal 1: I BUE HEP  Long term goal 2: I/MI LB dressing and bathing  Long term goal 3:  Increase to 8-10min stand gunnar/end for decreased fall risk during ADL  Long term goal 4: I/MI fxl ADL xfers  Patient Goals   Patient goals : Pt wants to walk easily again       Therapy Time   Individual Concurrent Group Co-treatment   Time In  11:20am         Time Out  12:10pm         Minutes  [address] Number: [id number]

## 2019-07-04 LAB
ANION GAP SERPL CALCULATED.3IONS-SCNC: 11 MEQ/L (ref 9–15)
BUN BLDV-MCNC: 31 MG/DL (ref 8–23)
CALCIUM SERPL-MCNC: 8.7 MG/DL (ref 8.5–9.9)
CHLORIDE BLD-SCNC: 108 MEQ/L (ref 95–107)
CO2: 27 MEQ/L (ref 20–31)
CREAT SERPL-MCNC: 1.25 MG/DL (ref 0.7–1.2)
GFR AFRICAN AMERICAN: >60
GFR NON-AFRICAN AMERICAN: 54.3
GLUCOSE BLD-MCNC: 87 MG/DL (ref 70–99)
HCT VFR BLD CALC: 30.3 % (ref 42–52)
HEMOGLOBIN: 9.8 G/DL (ref 14–18)
INR BLD: 1.8
MCH RBC QN AUTO: 31.5 PG (ref 27–31.3)
MCHC RBC AUTO-ENTMCNC: 32.2 % (ref 33–37)
MCV RBC AUTO: 97.8 FL (ref 80–100)
PDW BLD-RTO: 20.4 % (ref 11.5–14.5)
PLATELET # BLD: 199 K/UL (ref 130–400)
POTASSIUM SERPL-SCNC: 3.7 MEQ/L (ref 3.4–4.9)
PROTHROMBIN TIME: 17.3 SEC (ref 9–11.5)
RBC # BLD: 3.1 M/UL (ref 4.7–6.1)
SODIUM BLD-SCNC: 146 MEQ/L (ref 135–144)
WBC # BLD: 6.4 K/UL (ref 4.8–10.8)

## 2019-07-04 PROCEDURE — 80048 BASIC METABOLIC PNL TOTAL CA: CPT

## 2019-07-04 PROCEDURE — 85027 COMPLETE CBC AUTOMATED: CPT

## 2019-07-04 PROCEDURE — 36415 COLL VENOUS BLD VENIPUNCTURE: CPT

## 2019-07-04 PROCEDURE — 97116 GAIT TRAINING THERAPY: CPT | Performed by: PHYSICAL THERAPIST

## 2019-07-04 PROCEDURE — 6370000000 HC RX 637 (ALT 250 FOR IP): Performed by: INTERNAL MEDICINE

## 2019-07-04 PROCEDURE — 97530 THERAPEUTIC ACTIVITIES: CPT | Performed by: PHYSICAL THERAPIST

## 2019-07-04 PROCEDURE — 85610 PROTHROMBIN TIME: CPT

## 2019-07-04 PROCEDURE — 1200000002 HC SEMI PRIVATE SWING BED

## 2019-07-04 RX ORDER — POLYETHYLENE GLYCOL 3350 17 G/17G
17 POWDER, FOR SOLUTION ORAL DAILY PRN
Status: DISCONTINUED | OUTPATIENT
Start: 2019-07-04 | End: 2019-07-09 | Stop reason: HOSPADM

## 2019-07-04 RX ORDER — WARFARIN SODIUM 3 MG/1
3 TABLET ORAL
Status: COMPLETED | OUTPATIENT
Start: 2019-07-04 | End: 2019-07-04

## 2019-07-04 RX ORDER — SENNA PLUS 8.6 MG/1
1 TABLET ORAL NIGHTLY PRN
Status: DISCONTINUED | OUTPATIENT
Start: 2019-07-04 | End: 2019-07-09 | Stop reason: HOSPADM

## 2019-07-04 RX ADMIN — FOLIC ACID 1 MG: 1 TABLET ORAL at 08:16

## 2019-07-04 RX ADMIN — WARFARIN SODIUM 3 MG: 3 TABLET ORAL at 16:54

## 2019-07-04 RX ADMIN — SACUBITRIL AND VALSARTAN 1 TABLET: 24; 26 TABLET, FILM COATED ORAL at 20:49

## 2019-07-04 RX ADMIN — FERROUS SULFATE TAB 325 MG (65 MG ELEMENTAL FE) 325 MG: 325 (65 FE) TAB at 08:16

## 2019-07-04 RX ADMIN — POTASSIUM CHLORIDE 20 MEQ: 20 SOLUTION ORAL at 08:16

## 2019-07-04 RX ADMIN — METOPROLOL TARTRATE 25 MG: 25 TABLET ORAL at 08:16

## 2019-07-04 RX ADMIN — ANAGRELIDE 1 MG: 0.5 CAPSULE ORAL at 20:50

## 2019-07-04 RX ADMIN — ANAGRELIDE 1 MG: 0.5 CAPSULE ORAL at 08:17

## 2019-07-04 RX ADMIN — SACUBITRIL AND VALSARTAN 1 TABLET: 24; 26 TABLET, FILM COATED ORAL at 08:16

## 2019-07-04 RX ADMIN — METOPROLOL TARTRATE 25 MG: 25 TABLET ORAL at 20:48

## 2019-07-04 RX ADMIN — FUROSEMIDE 20 MG: 20 TABLET ORAL at 08:16

## 2019-07-04 ASSESSMENT — PAIN SCALES - GENERAL
PAINLEVEL_OUTOF10: 0
PAINLEVEL_OUTOF10: 0

## 2019-07-04 NOTE — PROGRESS NOTES
Physical Therapy  Facility/Department: [facility] MED SURG UNIT  Daily Treatment Note  NAME: Lay Serrano  : 1929  MRN: [id number]    Date of Service: 2019    Discharge Recommendations:  Home with Home health PT, Home with assist PRN        Assessment   Body structures, Functions, Activity limitations: Decreased functional mobility ; Decreased strength;Decreased endurance;Decreased balance;Decreased safe awareness  Treatment Diagnosis: dififcutly walking , LE weakness, decreased function  Prognosis: Good  REQUIRES PT FOLLOW UP: Yes  Activity Tolerance  Activity Tolerance: Patient Tolerated treatment well    Pt progressing well assisted to bathroom pt was able to doff pants then min assist to re-don and STA for pt to wash hands. Pt ambulation distance increased today however, with longer distance notable fatigue set in for last 25-30 feet having difficulties with (R) LE needing cues to lift and also more Min-Mod assist and required assistance for walker safety both verbal and tactile cueing. Pt with good tolerance performing LE there. Ex again noting more fatigue and challenge with (R) LE vs (L) LE and continued swelling present in (R) foot > (L) foot. Pt with continued skilled care addressing strength stability posture balance gait and stairs improving functional mobility and returning to max levels. Patient Diagnosis(es): There were no encounter diagnoses. has a past medical history of Anxiety disorder, Arthritis, Carbajal's esophagus, Cataract, DVT (deep venous thrombosis) ([address] GERD (gastroesophageal reflux disease), HTN (hypertension), Prostate enlargement, Pulmonary embolism ([address] and Type 2 diabetes mellitus ([address] has a past surgical history that includes back surgery and Inguinal hernia repair (Bilateral). Restrictions  Restrictions/Precautions  Restrictions/Precautions: Fall Risk  Required Braces or Orthoses?: No  Subjective   General  Chart Reviewed: Yes  Additional Pertinent Hx:  On coumadin, Afib,   Family / Caregiver Present: No  Referring Practitioner: Dr. Shanice Finch  Patient Currently in Pain: Denies  Pain Assessment  Pain Assessment: 0-10  Pain Level: 0  Vital Signs  Patient Currently in Pain: Denies       Orientation     Cognition      Objective      Transfers  Sit to Stand: Contact guard assistance;Minimal Assistance  Stand to sit: Contact guard assistance  Ambulation  Ambulation?: Yes  Ambulation 1  Surface: level tile  Device: Rolling Walker  Assistance: Contact guard assistance  Quality of Gait: flexed posture   Gait Deviations: Slow [place];Decreased step length  Distance: 30x2 100x2 with standing rest breaks  Comments: VC for posture and walker safety. Cues to roll and not lift up. Very fatigud last 25 feet.   Stairs/Curb  Stairs?: No     Balance  Posture: Fair  Sitting - Static: Good  Sitting - Dynamic: Good  Standing - Static: Good;-  Standing - Dynamic: Fair  Exercises  Hip Flexion: seated marching 2x10  Knee Long Arc Quad: 2x10 ea  Ankle Pumps: x20 seated HR/TR  Other exercises  Other exercises?: Yes  Other exercises 1: pillow squeeze 3\"x20       G-Code     OutComes Score     AM-PAC Score     Goals  Short term goals  Time Frame for Short term goals: 2-3 days  Short term goal 1: transfers with ww and close supervision without vc  Short term goal 2: amb >= 120 ft with ww and <= SBA , no verbal cues   Short term goal 3: assess full flight of stairs ( 10) with 1 rail and <= Min A of 1  Short term goal 4: tolerate 2x10 LE seated CLIVE  Short term goal 5: increase balance any amount to achieve function  Long term goals  Time Frame for Long term goals : 7-10 days   Long term goal 1: transfers and bed mobiilty modified independent  Long term goal 2: amb >= 150ft with ww or Rollator modified independent  Long term goal 3: 10 steps with 1 rail safe <= CS  Long term goal 4: increase LE strength and balance any amount to achieve functional goals   Long term goal 5: HEP in place for discharge  Patient Goals   Patient goals : Hector Loera would like to go home. How soon can I go home? \" Family wants pt steadier with ww so decreased/no  falls at home. Plan    Plan  Times per week: 5-7  Times per day: Daily  Plan weeks: 1 week  Current Treatment Recommendations: Strengthening, Functional Mobility Training, Transfer Training, Balance Training, Endurance Training, Gait Training, Stair training, Neuromuscular Re-education, Patient/Caregiver Education & Training, Safety Education & Training, Equipment Evaluation, Education, & procurement  Safety Devices  Type of devices:  All fall risk precautions in place, Call light within reach, Chair alarm in place, Left in chair     Therapy Time   Individual Concurrent Group Co-treatment   Time In  1240         Time Out  1315         Minutes  [facility] [address] Number: [phone]

## 2019-07-05 LAB
INR BLD: 2
PROTHROMBIN TIME: 19.1 SEC (ref 9–11.5)

## 2019-07-05 PROCEDURE — 97530 THERAPEUTIC ACTIVITIES: CPT

## 2019-07-05 PROCEDURE — 97110 THERAPEUTIC EXERCISES: CPT

## 2019-07-05 PROCEDURE — 6370000000 HC RX 637 (ALT 250 FOR IP): Performed by: INTERNAL MEDICINE

## 2019-07-05 PROCEDURE — 92523 SPEECH SOUND LANG COMPREHEN: CPT

## 2019-07-05 PROCEDURE — 97116 GAIT TRAINING THERAPY: CPT

## 2019-07-05 PROCEDURE — 36415 COLL VENOUS BLD VENIPUNCTURE: CPT

## 2019-07-05 PROCEDURE — 85610 PROTHROMBIN TIME: CPT

## 2019-07-05 PROCEDURE — 1200000002 HC SEMI PRIVATE SWING BED

## 2019-07-05 RX ADMIN — ANAGRELIDE 1 MG: 0.5 CAPSULE ORAL at 21:00

## 2019-07-05 RX ADMIN — WARFARIN SODIUM 2.5 MG: 2 TABLET ORAL at 19:14

## 2019-07-05 RX ADMIN — POTASSIUM CHLORIDE 20 MEQ: 20 SOLUTION ORAL at 08:07

## 2019-07-05 RX ADMIN — SACUBITRIL AND VALSARTAN 1 TABLET: 24; 26 TABLET, FILM COATED ORAL at 20:59

## 2019-07-05 RX ADMIN — SACUBITRIL AND VALSARTAN 1 TABLET: 24; 26 TABLET, FILM COATED ORAL at 08:07

## 2019-07-05 RX ADMIN — FUROSEMIDE 20 MG: 20 TABLET ORAL at 08:07

## 2019-07-05 RX ADMIN — METOPROLOL TARTRATE 25 MG: 25 TABLET ORAL at 08:07

## 2019-07-05 RX ADMIN — FOLIC ACID 1 MG: 1 TABLET ORAL at 08:07

## 2019-07-05 RX ADMIN — FERROUS SULFATE TAB 325 MG (65 MG ELEMENTAL FE) 325 MG: 325 (65 FE) TAB at 08:07

## 2019-07-05 RX ADMIN — METOPROLOL TARTRATE 25 MG: 25 TABLET ORAL at 20:59

## 2019-07-05 RX ADMIN — ANAGRELIDE 1 MG: 0.5 CAPSULE ORAL at 08:08

## 2019-07-05 ASSESSMENT — PAIN SCALES - GENERAL
PAINLEVEL_OUTOF10: 0

## 2019-07-05 NOTE — PROGRESS NOTES
Plan  Times per week: 3-6x/wk  Times per day: Daily  Plan weeks: 1-2 wks  Current Treatment Recommendations: Strengthening, ROM, Balance Training, Functional Mobility Training, Endurance Training, Stair training, Safety Education & Training, Patient/Caregiver Education & Training, Self-Care / ADL, Home Management Training             Goals  Short term goals  Time Frame for Short term goals: 5-7 days  Short term goal 1: Tolerate 25-30min BUE ther ex/act to increase strength/end for ADL  Short term goal 2: Increase to SBA/Spv toileting  Short term goal 3: Increase to SBA/Spv LB dressing and bathing  Short term goal 4: I/MI G/H ADL  Short term goal 5: Pt. will complete UB/LB dressing with S.  Long term goals  Time Frame for Long term goals : 7-10 days  Long term goal 1: I BUE HEP  Long term goal 2: I/MI LB dressing and bathing  Long term goal 3:  Increase to 8-10min stand gunnar/end for decreased fall risk during ADL  Long term goal 4: I/MI fxl ADL xfers  Long term goal 5: Same as STG  Patient Goals   Patient goals : Pt wants to walk easily again       Therapy Time   Individual Concurrent Group Co-treatment   Time In  2:30         Time Out  3:30         Minutes  60                 Wallace Kelly, OT  License and [address] Number: [id number]

## 2019-07-05 NOTE — PROGRESS NOTES
Physician Progress Note    2019   9:36 AM    Name:  Jose Cruz  MRN:    [id number]      Day: 4     Admit Date: 2019  3:37 PM  PCP: Stormy Hayden MD    Code Status:  DNR-CC    Subjective:      no new events, doing well no new complaints. Physical Examination:      Vitals:  /61   Pulse 84   Temp 97.7 °F (36.5 °C) (Oral)   Resp 18   Ht 5' 8\" (1.727 m)   SpO2 93%   BMI 25.09 kg/m²   Temp (24hrs), Av.7 °F (36.5 °C), Min:97.7 °F (36.5 °C), Max:97.7 °F (36.5 °C)      General appearance: alert, cooperative and no distress  Mental Status: oriented to person, place and time and normal affect  Lungs: clear to auscultation bilaterally, normal effort  Heart: Irregularly irregular heart rate with no tachycardia. Abdomen: soft, nontender, nondistended, bowel sounds present, no masses  Extremities: 1+ bilateral lower extremity pitting edema, no redness, tenderness in the calves  Skin: no gross lesions, rashes    Data:     Labs:  Recent Labs     19  0457   WBC 6.4   HGB 9.8*        Recent Labs     19  0457   *   K 3.7   *   CO2 27   BUN 31*   CREATININE 1.25*   GLUCOSE 87     No results for input(s): AST, ALT, ALB, BILITOT, ALKPHOS in the last 72 hours.     Current Facility-Administered Medications   Medication Dose Route Frequency Provider Last Rate Last Dose    senna (SENOKOT) tablet 8.6 mg  1 tablet Oral Nightly PRN Angus Aly,         polyethylene glycol (GLYCOLAX) packet 17 g  17 g Oral Daily PRN [address],         magnesium hydroxide (MILK OF MAGNESIA) 400 MG/5ML suspension 30 mL  30 mL Oral Daily PRN Yvon Coburn MD        ondansetron [facility]) injection 4 mg  4 mg Intravenous Q6H PRN Yvon Coburn MD        sodium chloride flush 0.9 % injection 10 mL  10 mL Intravenous PRN Yvon Coburn MD        anagrelide Pacheco Abler) capsule 1 mg  1 mg Oral BID Yvon Coburn MD   1 mg at 19 0808    ferrous sulfate tablet 325 mg  325 mg Oral Daily with breakfast Bong Kuo MD   325 mg at 62/52/06 9174    folic acid (FOLVITE) tablet 1 mg  1 mg Oral Daily Bong Kuo MD   1 mg at 07/05/19 6241    furosemide (LASIX) tablet 20 mg  20 mg Oral Daily Bong Kuo MD   20 mg at 07/05/19 3032    metoprolol tartrate (LOPRESSOR) tablet 25 mg  25 mg Oral BID oBng Kuo MD   25 mg at 07/05/19 8736    potassium chloride 20 MEQ/15ML (10%) oral solution 20 mEq  20 mEq Oral Daily Bong Kuo MD   20 mEq at 07/05/19 9447    sacubitril-valsartan (ENTRESTO) 24-26 MG per tablet 1 tablet  1 tablet Oral BID Bong Kuo MD   1 tablet at 07/05/19 0807    [START ON 6/30/2020] warfarin (COUMADIN) daily dosing (placeholder)   Other Maria Eugenia Adamson MD         Assessment and Plan: Active problems:    1. Acute on likely chronic systolic heart failure with EF of 25%-resume diuretics as per cardiology, resume beta-blocker and ARB, monitor volume status. Seems euvolemic, doing well  2. A. Fib-resume Coumadin beta-blockers  3. History of anemia-guaiac was negative, resume anticoagulation, monitor twice a week  4. Gait instability and physical deconditioning-resume rehab as per rehab orders  5. Functional Status: Fall precautions. Up with assistance. PT OT  6. Diet: Cardiac   7. DVT ppx: Coumadin  8.  Disposition: Subacute rehab             DISCHARGE PLANNING  Resume rehab       Electronically signed by Kyree Echevarria DO on 7/5/2019 at 9:36 AM

## 2019-07-05 NOTE — PROGRESS NOTES
Stand: Contact guard assistance(ww)  Stand to sit: Contact guard assistance(ww)  Bed to Chair: Contact guard assistance(ww)  Comment: min vc for hand placement  Ambulation  Ambulation?: Yes  Ambulation 1  Surface: level tile  Device: Rolling Walker  Other Apparatus: Wheelchair follow  Assistance: Contact guard assistance  Quality of Gait: flexed posture   Gait Deviations: Slow Marysol;Decreased step length  Distance: 15x1, 100x1  Comments: VC's for upright posture and for head upright. Stairs/Curb  Stairs?: Yes  Stairs  # Steps : 2  Stairs Height: 6\"  Rails: Left ascending  Device: No Device  Assistance: Contact guard assistance  Comment: 2 hand on L HR marked time ascending and descends backwards     Balance  Sitting - Static: Good  Sitting - Dynamic: Good  Standing - Static: Good;-(ww)  Standing - Dynamic: Fair(with ww)  Exercises  Hamstring Sets: 20x YTB  Hip Flexion: 20x  Hip Abduction: 20x  Knee Long Arc Quad: 20x  Knee Short Arc Quad: 20x  Ankle Pumps: 20x  Other exercises  Other exercises 1: ball sq 30x                                     Goals  Short term goals  Time Frame for Short term goals: 2-3 days  Short term goal 1: transfers with ww and close supervision without vc  Short term goal 2: amb >= 120 ft with ww and <= SBA , no verbal cues   Short term goal 3: assess full flight of stairs ( 10) with 1 rail and <= Min A of 1  Short term goal 4: tolerate 2x10 LE seated CLIVE  Short term goal 5: increase balance any amount to achieve function  Long term goals  Time Frame for Long term goals : 7-10 days   Long term goal 1: transfers and bed mobiilty modified independent  Long term goal 2: amb >= 150ft with ww or Rollator modified independent  Long term goal 3: 10 steps with 1 rail safe <= CS  Long term goal 4: increase LE strength and balance any amount to achieve functional goals   Long term goal 5: HEP in place for discharge  Patient Goals   Patient goals : \"I would like to go home. How soon can I go home? \" Family wants pt steadier with ww so decreased/no  falls at home. Plan    Plan  Times per week: 5-7  Times per day: (1-2x per day)  Plan weeks: 1 week  Current Treatment Recommendations: Strengthening, Functional Mobility Training, Transfer Training, Balance Training, Endurance Training, Gait Training, Stair training, Neuromuscular Re-education, Patient/Caregiver Education & Training, Safety Education & Training, Equipment Evaluation, Education, & procurement  Safety Devices  Type of devices:  All fall risk precautions in place, Call light within reach, Chair alarm in place, Left in chair     Therapy Time   Individual Concurrent Group Co-treatment   Time In  945         Time Out  [facility], [facility]  License and [address] Number: [id number]

## 2019-07-05 NOTE — PROGRESS NOTES
Chart reviewed. Patient's care discussed in daily quality rounds. Plan remains for patient to DC home on Tuesday 7/9/2019 with [facility]. Fatemeh Plascencia arranged and REYNALDO completed.

## 2019-07-05 NOTE — PROGRESS NOTES
functional goals   Long term goal 5: HEP in place for discharge  Patient Goals   Patient goals : \"I would like to go home. How soon can I go home? \" Family wants pt steadier with ww so decreased/no  falls at home. Plan    Plan  Times per week: 5-7  Times per day: (1-2x per day)  Plan weeks: 1 week  Current Treatment Recommendations: Strengthening, Functional Mobility Training, Transfer Training, Balance Training, Endurance Training, Gait Training, Stair training, Neuromuscular Re-education, Patient/Caregiver Education & Training, Safety Education & Training, Equipment Evaluation, Education, & procurement  Safety Devices  Type of devices:  All fall risk precautions in place, Call light within reach, Chair alarm in place, Left in chair     Therapy Time   Individual Concurrent Group Co-treatment   Time In  1230         Time Out  130         Minutes  Sadie Young, PTA  License and [address] Number: [phone]

## 2019-07-06 LAB
INR BLD: 2.4
PROTHROMBIN TIME: 22.8 SEC (ref 9–11.5)

## 2019-07-06 PROCEDURE — 97116 GAIT TRAINING THERAPY: CPT

## 2019-07-06 PROCEDURE — 1200000002 HC SEMI PRIVATE SWING BED

## 2019-07-06 PROCEDURE — 36415 COLL VENOUS BLD VENIPUNCTURE: CPT

## 2019-07-06 PROCEDURE — 6370000000 HC RX 637 (ALT 250 FOR IP): Performed by: INTERNAL MEDICINE

## 2019-07-06 PROCEDURE — 85610 PROTHROMBIN TIME: CPT

## 2019-07-06 PROCEDURE — 97110 THERAPEUTIC EXERCISES: CPT

## 2019-07-06 RX ORDER — WARFARIN SODIUM 2 MG/1
2 TABLET ORAL
Status: COMPLETED | OUTPATIENT
Start: 2019-07-06 | End: 2019-07-06

## 2019-07-06 RX ADMIN — FERROUS SULFATE TAB 325 MG (65 MG ELEMENTAL FE) 325 MG: 325 (65 FE) TAB at 09:39

## 2019-07-06 RX ADMIN — FUROSEMIDE 20 MG: 20 TABLET ORAL at 09:39

## 2019-07-06 RX ADMIN — SACUBITRIL AND VALSARTAN 1 TABLET: 24; 26 TABLET, FILM COATED ORAL at 20:17

## 2019-07-06 RX ADMIN — POTASSIUM CHLORIDE 20 MEQ: 20 SOLUTION ORAL at 09:39

## 2019-07-06 RX ADMIN — ANAGRELIDE 1 MG: 0.5 CAPSULE ORAL at 09:40

## 2019-07-06 RX ADMIN — FOLIC ACID 1 MG: 1 TABLET ORAL at 09:39

## 2019-07-06 RX ADMIN — SACUBITRIL AND VALSARTAN 1 TABLET: 24; 26 TABLET, FILM COATED ORAL at 09:39

## 2019-07-06 RX ADMIN — METOPROLOL TARTRATE 25 MG: 25 TABLET ORAL at 09:39

## 2019-07-06 RX ADMIN — ANAGRELIDE 1 MG: 0.5 CAPSULE ORAL at 20:17

## 2019-07-06 RX ADMIN — WARFARIN SODIUM 2 MG: 2 TABLET ORAL at 17:24

## 2019-07-06 RX ADMIN — METOPROLOL TARTRATE 25 MG: 25 TABLET ORAL at 20:17

## 2019-07-06 ASSESSMENT — PAIN SCALES - GENERAL
PAINLEVEL_OUTOF10: 0
PAINLEVEL_OUTOF10: 0

## 2019-07-07 LAB
INR BLD: 2.5
PROTHROMBIN TIME: 23.6 SEC (ref 9–11.5)

## 2019-07-07 PROCEDURE — 97116 GAIT TRAINING THERAPY: CPT | Performed by: PHYSICAL THERAPIST

## 2019-07-07 PROCEDURE — 36415 COLL VENOUS BLD VENIPUNCTURE: CPT

## 2019-07-07 PROCEDURE — 1200000002 HC SEMI PRIVATE SWING BED

## 2019-07-07 PROCEDURE — 6370000000 HC RX 637 (ALT 250 FOR IP): Performed by: INTERNAL MEDICINE

## 2019-07-07 PROCEDURE — 85610 PROTHROMBIN TIME: CPT

## 2019-07-07 PROCEDURE — 6360000002 HC RX W HCPCS: Performed by: INTERNAL MEDICINE

## 2019-07-07 PROCEDURE — 97530 THERAPEUTIC ACTIVITIES: CPT | Performed by: PHYSICAL THERAPIST

## 2019-07-07 RX ORDER — WARFARIN SODIUM 2 MG/1
2 TABLET ORAL
Status: COMPLETED | OUTPATIENT
Start: 2019-07-07 | End: 2019-07-07

## 2019-07-07 RX ORDER — FUROSEMIDE 10 MG/ML
20 INJECTION INTRAMUSCULAR; INTRAVENOUS ONCE
Status: COMPLETED | OUTPATIENT
Start: 2019-07-07 | End: 2019-07-07

## 2019-07-07 RX ADMIN — ANAGRELIDE 1 MG: 0.5 CAPSULE ORAL at 20:26

## 2019-07-07 RX ADMIN — ANAGRELIDE 1 MG: 0.5 CAPSULE ORAL at 08:10

## 2019-07-07 RX ADMIN — WARFARIN SODIUM 2 MG: 2 TABLET ORAL at 16:56

## 2019-07-07 RX ADMIN — SACUBITRIL AND VALSARTAN 1 TABLET: 24; 26 TABLET, FILM COATED ORAL at 08:10

## 2019-07-07 RX ADMIN — FOLIC ACID 1 MG: 1 TABLET ORAL at 08:10

## 2019-07-07 RX ADMIN — FUROSEMIDE 20 MG: 10 INJECTION, SOLUTION INTRAMUSCULAR; INTRAVENOUS at 11:30

## 2019-07-07 RX ADMIN — METOPROLOL TARTRATE 25 MG: 25 TABLET ORAL at 08:09

## 2019-07-07 RX ADMIN — METOPROLOL TARTRATE 25 MG: 25 TABLET ORAL at 20:26

## 2019-07-07 RX ADMIN — FERROUS SULFATE TAB 325 MG (65 MG ELEMENTAL FE) 325 MG: 325 (65 FE) TAB at 08:09

## 2019-07-07 RX ADMIN — POTASSIUM CHLORIDE 20 MEQ: 20 SOLUTION ORAL at 08:09

## 2019-07-07 RX ADMIN — FUROSEMIDE 20 MG: 20 TABLET ORAL at 08:10

## 2019-07-07 RX ADMIN — SACUBITRIL AND VALSARTAN 1 TABLET: 24; 26 TABLET, FILM COATED ORAL at 20:26

## 2019-07-07 ASSESSMENT — PAIN SCALES - GENERAL
PAINLEVEL_OUTOF10: 0

## 2019-07-07 NOTE — PROGRESS NOTES
Treatment Recommendations: Strengthening, Functional Mobility Training, Transfer Training, Balance Training, Endurance Training, Gait Training, Stair training, Neuromuscular Re-education, Patient/Caregiver Education & Training, Safety Education & Training, Equipment Evaluation, Education, & procurement  Safety Devices  Type of devices:  All fall risk precautions in place, Call light within reach, Chair alarm in place, Left in chair     Therapy Time   Individual Concurrent Group Co-treatment   Time In  1050         Time Out  1125         Minutes  [facility] [address] Number: [phone]

## 2019-07-08 LAB
ANION GAP SERPL CALCULATED.3IONS-SCNC: 13 MEQ/L (ref 9–15)
BUN BLDV-MCNC: 29 MG/DL (ref 8–23)
CALCIUM SERPL-MCNC: 8.8 MG/DL (ref 8.5–9.9)
CHLORIDE BLD-SCNC: 107 MEQ/L (ref 95–107)
CO2: 25 MEQ/L (ref 20–31)
CREAT SERPL-MCNC: 1.43 MG/DL (ref 0.7–1.2)
GFR AFRICAN AMERICAN: 56.2
GFR NON-AFRICAN AMERICAN: 46.5
GLUCOSE BLD-MCNC: 81 MG/DL (ref 70–99)
HCT VFR BLD CALC: 28.2 % (ref 42–52)
HEMOGLOBIN: 9.3 G/DL (ref 14–18)
INR BLD: 2.8
MCH RBC QN AUTO: 31.9 PG (ref 27–31.3)
MCHC RBC AUTO-ENTMCNC: 32.8 % (ref 33–37)
MCV RBC AUTO: 97.3 FL (ref 80–100)
PDW BLD-RTO: 20.3 % (ref 11.5–14.5)
PLATELET # BLD: 192 K/UL (ref 130–400)
POTASSIUM SERPL-SCNC: 3.8 MEQ/L (ref 3.4–4.9)
PRO-BNP: 7282 PG/ML
PROTHROMBIN TIME: 26.7 SEC (ref 9–11.5)
RBC # BLD: 2.9 M/UL (ref 4.7–6.1)
SODIUM BLD-SCNC: 145 MEQ/L (ref 135–144)
WBC # BLD: 5.4 K/UL (ref 4.8–10.8)

## 2019-07-08 PROCEDURE — 36415 COLL VENOUS BLD VENIPUNCTURE: CPT

## 2019-07-08 PROCEDURE — 97530 THERAPEUTIC ACTIVITIES: CPT

## 2019-07-08 PROCEDURE — 1200000002 HC SEMI PRIVATE SWING BED

## 2019-07-08 PROCEDURE — 97116 GAIT TRAINING THERAPY: CPT

## 2019-07-08 PROCEDURE — 83880 ASSAY OF NATRIURETIC PEPTIDE: CPT

## 2019-07-08 PROCEDURE — 80048 BASIC METABOLIC PNL TOTAL CA: CPT

## 2019-07-08 PROCEDURE — 97110 THERAPEUTIC EXERCISES: CPT

## 2019-07-08 PROCEDURE — 2580000003 HC RX 258: Performed by: INTERNAL MEDICINE

## 2019-07-08 PROCEDURE — 97535 SELF CARE MNGMENT TRAINING: CPT

## 2019-07-08 PROCEDURE — 85027 COMPLETE CBC AUTOMATED: CPT

## 2019-07-08 PROCEDURE — 85610 PROTHROMBIN TIME: CPT

## 2019-07-08 PROCEDURE — 6370000000 HC RX 637 (ALT 250 FOR IP): Performed by: INTERNAL MEDICINE

## 2019-07-08 RX ORDER — WARFARIN SODIUM 2 MG/1
2 TABLET ORAL
Status: COMPLETED | OUTPATIENT
Start: 2019-07-08 | End: 2019-07-08

## 2019-07-08 RX ADMIN — SACUBITRIL AND VALSARTAN 1 TABLET: 24; 26 TABLET, FILM COATED ORAL at 19:50

## 2019-07-08 RX ADMIN — ANAGRELIDE 1 MG: 0.5 CAPSULE ORAL at 19:51

## 2019-07-08 RX ADMIN — WARFARIN SODIUM 2 MG: 2 TABLET ORAL at 18:40

## 2019-07-08 RX ADMIN — SACUBITRIL AND VALSARTAN 1 TABLET: 24; 26 TABLET, FILM COATED ORAL at 10:34

## 2019-07-08 RX ADMIN — METOPROLOL TARTRATE 25 MG: 25 TABLET ORAL at 19:50

## 2019-07-08 RX ADMIN — FOLIC ACID 1 MG: 1 TABLET ORAL at 10:35

## 2019-07-08 RX ADMIN — POTASSIUM CHLORIDE 20 MEQ: 20 SOLUTION ORAL at 10:35

## 2019-07-08 RX ADMIN — FUROSEMIDE 20 MG: 20 TABLET ORAL at 10:35

## 2019-07-08 RX ADMIN — Medication 10 ML: at 19:50

## 2019-07-08 RX ADMIN — FERROUS SULFATE TAB 325 MG (65 MG ELEMENTAL FE) 325 MG: 325 (65 FE) TAB at 10:35

## 2019-07-08 RX ADMIN — METOPROLOL TARTRATE 25 MG: 25 TABLET ORAL at 10:35

## 2019-07-08 RX ADMIN — ANAGRELIDE 1 MG: 0.5 CAPSULE ORAL at 10:34

## 2019-07-08 ASSESSMENT — PAIN SCALES - GENERAL: PAINLEVEL_OUTOF10: 0

## 2019-07-08 NOTE — PROGRESS NOTES
Physical Therapy  Facility/Department: [facility] MED SURG UNIT  Daily Treatment Note  NAME: Jose Sanchez  : 1929  MRN: [id number]    Date of Service: 2019    Discharge Recommendations:  Home with Home health PT, Home with assist PRN        Assessment   Body structures, Functions, Activity limitations: Decreased functional mobility ; Decreased strength;Decreased endurance;Decreased balance;Decreased safe awareness  Assessment: Focused on gait trial this afternoon with pt ambulating with improved posture and safety dec height of AD. Pt declined ther ex this afternoon. \"I think I did enough today\". Pt returned to room to sit up in chair. Call light and chair alarm in place. Pt to be D/C'd to home tomorrow. Treatment Diagnosis: dififcutly walking , LE weakness, decreased function  REQUIRES PT FOLLOW UP: Yes     Patient Diagnosis(es): There were no encounter diagnoses. has a past medical history of Anxiety disorder, Arthritis, Carbajal's esophagus, Cataract, DVT (deep venous thrombosis) ([address] GERD (gastroesophageal reflux disease), HTN (hypertension), Prostate enlargement, Pulmonary embolism ([address] and Type 2 diabetes mellitus ([address] has a past surgical history that includes back surgery and Inguinal hernia repair (Bilateral). Restrictions  Restrictions/Precautions  Restrictions/Precautions: Fall Risk  Required Braces or Orthoses?: No  Subjective   General  Chart Reviewed: Yes  Additional Pertinent Hx: On coumadin, Afib,   Response To Previous Treatment: Patient with no complaints from previous session. Family / Caregiver Present: No  Referring Practitioner: Dr. Jacki Puente  Subjective  Subjective: Pt sitting up in room and states, \"I have therapy again? \" Pt agreeable to gait trial. \"I'm going home tomorrow\".    Pain Screening  Patient Currently in Pain: No  Vital Signs  Patient Currently in Pain: No       Orientation     Cognition      Objective      Transfers  Sit to Stand: Stand by assistance  Stand to sit:

## 2019-07-08 NOTE — PROGRESS NOTES
Clinical Pharmacy Note    Warfarin consult follow-up    Recent Labs     07/08/19  0424   INR 2.8     Recent Labs     07/08/19  0424   HGB 9.3*   HCT 28.2*          Significant drug:drug interactions:  Patient is also on antiplatelet therapy with anagrelide     Notes:  Date INR Warfarin Dose   06/28/19 2.2  2mg     6/29/19    held    6/30/19    held    7/1/19  1.8  2 mg    7/2/19  1.7  4 mg    7/3/19  1.8  2.5 mg    7/4/19  1.8    3 mg    7/5/19  2.0    2.5 mg   7/6/19 2.4 2 mg   7/7/19 2.5 2 mg   7/8/19 2.8 2 mg        INR of 2.8 is within goal range of 2-3 for DVT and PE with filter and atrial fibrillation. As such, will give patient's home dosage of warfarin 2mg today only. Patient's normal maintenance dosage of warfarin is 14mg TWD. Continue with daily PT/INR until stable within therapeutic range for 5-7 days.      Lelia Fletcher, PharmD   7/8/2019 3:07 PM

## 2019-07-08 NOTE — PROGRESS NOTES
for discharge  Patient Goals   Patient goals : Ariana Vines would like to go home. How soon can I go home? \" Family wants pt steadier with ww so decreased/no  falls at home.     Plan    Plan  Times per week: 5-7  Times per day: Daily  Plan weeks: 1 week  Current Treatment Recommendations: Strengthening, Functional Mobility Training, Transfer Training, Balance Training, Endurance Training, Gait Training, Stair training, Neuromuscular Re-education, Patient/Caregiver Education & Training, Safety Education & Training, Equipment Evaluation, Education, & procurement  Safety Devices  Type of devices: Gait belt, Patient at risk for falls, Call light within reach, Left in chair     Therapy Time   Individual Concurrent Group Co-treatment   Time In  9:50         Time Out  10:50         Minutes  [facility] Number: [phone]

## 2019-07-09 VITALS
TEMPERATURE: 97.9 F | DIASTOLIC BLOOD PRESSURE: 67 MMHG | OXYGEN SATURATION: 95 % | BODY MASS INDEX: 25.2 KG/M2 | HEART RATE: 75 BPM | SYSTOLIC BLOOD PRESSURE: 122 MMHG | WEIGHT: 166.3 LBS | HEIGHT: 68 IN | RESPIRATION RATE: 18 BRPM

## 2019-07-09 LAB
INR BLD: 2.5
PROTHROMBIN TIME: 23.5 SEC (ref 9–11.5)

## 2019-07-09 PROCEDURE — 97535 SELF CARE MNGMENT TRAINING: CPT

## 2019-07-09 PROCEDURE — 97116 GAIT TRAINING THERAPY: CPT

## 2019-07-09 PROCEDURE — 97110 THERAPEUTIC EXERCISES: CPT

## 2019-07-09 PROCEDURE — 97530 THERAPEUTIC ACTIVITIES: CPT

## 2019-07-09 PROCEDURE — 85610 PROTHROMBIN TIME: CPT

## 2019-07-09 PROCEDURE — 36415 COLL VENOUS BLD VENIPUNCTURE: CPT

## 2019-07-09 PROCEDURE — 6370000000 HC RX 637 (ALT 250 FOR IP): Performed by: INTERNAL MEDICINE

## 2019-07-09 RX ORDER — ANAGRELIDE 0.5 MG/1
1 CAPSULE ORAL 2 TIMES DAILY
Qty: 60 CAPSULE | Refills: 2 | Status: SHIPPED | OUTPATIENT
Start: 2019-07-09

## 2019-07-09 RX ORDER — FUROSEMIDE 20 MG/1
20 TABLET ORAL DAILY
Qty: 30 TABLET | Refills: 5 | Status: SHIPPED | OUTPATIENT
Start: 2019-07-10

## 2019-07-09 RX ORDER — POTASSIUM CHLORIDE 20 MEQ/1
20 TABLET, EXTENDED RELEASE ORAL DAILY
Qty: 30 TABLET | Refills: 5 | Status: SHIPPED | OUTPATIENT
Start: 2019-07-09

## 2019-07-09 RX ADMIN — POTASSIUM CHLORIDE 20 MEQ: 20 SOLUTION ORAL at 09:36

## 2019-07-09 RX ADMIN — FOLIC ACID 1 MG: 1 TABLET ORAL at 09:36

## 2019-07-09 RX ADMIN — FUROSEMIDE 20 MG: 20 TABLET ORAL at 09:36

## 2019-07-09 RX ADMIN — ANAGRELIDE 1 MG: 0.5 CAPSULE ORAL at 09:41

## 2019-07-09 RX ADMIN — METOPROLOL TARTRATE 25 MG: 25 TABLET ORAL at 09:36

## 2019-07-09 RX ADMIN — SACUBITRIL AND VALSARTAN 1 TABLET: 24; 26 TABLET, FILM COATED ORAL at 09:36

## 2019-07-09 RX ADMIN — FERROUS SULFATE TAB 325 MG (65 MG ELEMENTAL FE) 325 MG: 325 (65 FE) TAB at 09:36

## 2019-07-09 ASSESSMENT — PAIN SCALES - GENERAL
PAINLEVEL_OUTOF10: 0
PAINLEVEL_OUTOF10: 0

## 2019-07-09 NOTE — PLAN OF CARE
Problem: Falls - Risk of:  Goal: Will remain free from falls  Description  Will remain free from falls  Outcome: Ongoing  Goal: Absence of physical injury  Description  Absence of physical injury  Outcome: Ongoing     Problem: Nutrition  Goal: Optimal nutrition therapy  Description  Nutrition Problem: No nutrition diagnosis at this time  Intervention: Food and/or Nutrient Delivery: Continue current diet  Nutritional Goals: Intake > 75% meals. Maintain weight in UBW range as edema resolves. Outcome: Ongoing     Problem: Daily Care:  Goal: Daily care needs are met  Description  Daily care needs are met  Outcome: Ongoing     Problem: Skin Integrity:  Goal: Skin integrity will stabilize  Description  Skin integrity will stabilize  Outcome: Ongoing     Problem: Pain:  Description  Pain management should include both nonpharmacologic and pharmacologic interventions.   Goal: Pain level will decrease  Description  Pain level will decrease  Outcome: Ongoing  Goal: Control of acute pain  Description  Control of acute pain  Outcome: Ongoing  Goal: Control of chronic pain  Description  Control of chronic pain  Outcome: Ongoing

## 2019-07-09 NOTE — DISCHARGE SUMMARY
Hospital Medicine Discharge Summary    Ted Agrawal  :  1929  MRN:  [id number]    Admit date:  2019  Discharge date:  2019    Admitting Physician:  Sharda Zarco MD  Primary Care Physician:  Wally Boateng MD      Discharge Diagnoses:    Assessment and plan: This documentation may or may not be complete, inclusive or conclusive.     *Functional impairment and debility.     *Cardiomyopathy, ejection fraction 25%.     *Parox atrial fibrillation.     *Hypertension     *Chronic stage III kidney failure at baseline.     *Anemia, no evidence of acute blood loss. Hospital Course:   Ted Agrawal is a 80 y.o. male that was admitted and treated at [facility] for the aforementioned medical issues. He had completed the recommended course of PT OT and graduated to be discharged home. Patient has multiple medical issues. I do not have clear or strong clinical justification to extend inpatient hospitalization. Patient however would definitely need and require close and frequent monitoring as well as additional work-up, investigation and therapeutic intervention that potentially could take place in the outpatient setting by primary care doctor in collaboration with other specialists. That is to prevent relapse, decompensation, rehospitalization and other medical implications. Patient was seen by the following consultants while admitted to [facility]:   Consults:  Regan Zaragoza  IP CONSULT TO PHARMACY    Significant Diagnostic Studies:    No results found.     Discharge Medications:       Billy Young   Home Medication Instructions GTO:[id number]    Printed on:19 1119   Medication Information                      ferrous sulfate 325 (65 Fe) MG tablet  Take 325 mg by mouth daily (with breakfast)             folic acid (FOLVITE) 1 MG tablet  Take 1 mg by mouth daily             furosemide (LASIX) 20 MG tablet  Take 1 tablet by mouth daily             metoprolol tartrate (LOPRESSOR) 25 MG tablet  Take 1 tablet by mouth 2 times daily             potassium chloride (KLOR-CON M) 20 MEQ extended release tablet  Take 1 tablet by mouth daily             sacubitril-valsartan (ENTRESTO) 24-26 MG per tablet  Take 1 tablet by mouth 2 times daily             warfarin (COUMADIN) 2 MG tablet  Take 1 tablet by mouth daily As directed                 Disposition:   Discharged to Home. Any [facility] needs that were indicated and/or required as been addressed and set up by Social Work. Condition at discharge: Pt was medically stable at the time of discharge. Significant improvement in clinical condition compared to initial condition at presentation to hospital    Activity: activity as tolerated, fall precautions. Total time taken for discharging this patient: 40 minutes. Greater than 70% of time was spent focused exclusively on this patient. Time was taken to review chart, discuss plans with consultants, reconciling medications, discussing plan answering questions with patient. Geraldine Meyer  7/9/2019, 11:19 AM  ----------------------------------------------------------------------------------------------------------------------    Jorje Mckeon,     Please return to ER or call 911 if you develop any significant signs or symptoms.     I may not have addressed all of your medical illnesses or the abnormal blood work or imaging therefore please ask your PCP, Conchita Miguel MD ,  to obtain [facility] record to follow up on all of the abnormal labs, imaging and findings that I have and have not addressed during your hospitalization.      Discharging you from the hospital does not mean that your medical care ends here and now.  You may still need additional work up, investigation, monitoring, and treatment to be handled from this point on by outside providers including your PCP, Conchita Miguel MD , Specialists and other healthcare providers.      Please review your list of discharge medications prior to resuming medications you might still have at home, as the medications you need to be taking, dosages or how often you must take them may have changed. For medication questions, contact your retail pharmacy and your PCP, Dhaval Medellin MD .     ** I STRONGLY RECOMMEND that you follow up with Dhaval Medellin MD within 3 to 5 days for a post hospitalization evaluation. This specific office visit is covered by your insurance, and is not the same as your annual doctor visit/ check up. This office visit is important, as it may prevent need for repeat and/or future hospitalizations. **    Your medical team at [facility]) appreciates the opportunity to work with you to get well! Sincerely,  Zeus Cornell Follow up with Dr. Dhaval Medellin MD in the next 7 days or sooner if needed. Please return to ER or call 911 if you develop any significant signs or symptoms. I may or may not have addressed all of your symptoms, medical issues,  Illnesses, or all of the abnormal blood work or imaging therefore please ask your PCP and other specialists to obtain [facility] record entirely to follow up on all of your symptoms, illnesses, abnormal labs, imaging and findings that I have and have not addressed during your hospitalization. Discharging you from the hospital does not mean that your medical care ends here and now. You may still need to have additional work up, investigation, monitoring, surveillance, and treatment to be handled from this point on by in the out patient setting by  out patient providers including your PCP, Specialists and other healthcare providers. For medication questions, contact your retail pharmacy and your PCP. Your medical team at [facility]) appreciates the opportunity to work with you to get well!     Zeus Cornell MD

## 2019-07-09 NOTE — PROGRESS NOTES
Discharge instructions reviewed with patient and daughter. All questions answered. Patient discharged via wheelchair accompanied by nursing staff and daughter.

## 2019-07-09 NOTE — PROGRESS NOTES
Treatment: Patient with no complaints from previous session. Family / Caregiver Present: No  Referring Practitioner: Dr. Amina Hogue  Subjective  Subjective: Pt. up in recliner at arrival and reports he is ready to go home. Pain Screening  Patient Currently in Pain: No  Vital Signs  Patient Currently in Pain: No       Orientation     Cognition      Objective      Transfers  Sit to Stand: Stand by assistance  Stand to sit: Stand by assistance  Comment: (P) Pt. performed 3 tsf trials. Pt. shazia's good safe tsf technique with 2 out of 3 trials requiring one VC's to reach to sit for safe tsf and reduce flop on chair surface. Ambulation  Ambulation?: Yes  More Ambulation?: Yes  Ambulation 1  Surface: level tile  Device: Rolling Walker  Assistance: Contact guard assistance  Quality of Gait: (P) PtMaris mccray's fair 3 point gait pattern with step to gait with slow pace. Gait Deviations: (P) Slow Marysol;Decreased step length  Distance: (P) 40'x2  Comments: (P) PtMaris mccray's good technique with performing stand pivot turns with FWW. However, pt. demo's unsteadiness and LOB with attempting to ambulate 2-3 retro steps. Minimal assistance needed to reduce risk of incident. Balance  Posture: Fair  Sitting - Static: Good  Sitting - Dynamic: Good  Standing - Static: Good  Standing - Dynamic: Good  Comments: (P) Dynamic stand training with one step activity with FWW to right self with increasing LE ANUJ to reduce LOB. PtMaris mccray's good response with side stepping and retro stepping with stand by assistance and education. Exercises  Comments: (P) Education on HEP with supine and seated ther ex for B LE strengthening.                          G-Code     OutComes Score                                                     AM-PAC Score             Goals  Short term goals  Time Frame for Short term goals: 2-3 days  Short term goal 1: transfers with ww and close supervision without vc  Short term goal 2: amb >= 120 ft with ww and <= SBA , no

## 2019-10-29 PROCEDURE — 86901 BLOOD TYPING SEROLOGIC RH(D): CPT

## 2019-10-29 PROCEDURE — 86900 BLOOD TYPING SEROLOGIC ABO: CPT

## 2019-10-29 PROCEDURE — 86920 COMPATIBILITY TEST SPIN: CPT

## 2019-10-29 PROCEDURE — 36415 COLL VENOUS BLD VENIPUNCTURE: CPT

## 2019-10-29 PROCEDURE — P9016 RBC LEUKOCYTES REDUCED: HCPCS

## 2019-10-29 PROCEDURE — 86850 RBC ANTIBODY SCREEN: CPT

## 2019-10-30 ENCOUNTER — HOSPITAL ENCOUNTER (OUTPATIENT)
Dept: INFUSION THERAPY | Age: 84
Setting detail: INFUSION SERIES
Discharge: HOME | End: 2019-10-30
Attending: INTERNAL MEDICINE
Payer: COMMERCIAL

## 2019-10-30 VITALS
DIASTOLIC BLOOD PRESSURE: 50 MMHG | TEMPERATURE: 97.7 F | RESPIRATION RATE: 18 BRPM | HEART RATE: 51 BPM | OXYGEN SATURATION: 97 % | SYSTOLIC BLOOD PRESSURE: 132 MMHG

## 2019-10-30 DIAGNOSIS — D50.0 IRON DEFICIENCY ANEMIA SECONDARY TO BLOOD LOSS (CHRONIC): Primary | ICD-10-CM

## 2019-10-30 LAB
ABO/RH: NORMAL
ANTIBODY SCREEN: NORMAL
BLOOD BANK DISPENSE STATUS: NORMAL
BLOOD BANK DISPENSE STATUS: NORMAL
BLOOD BANK PRODUCT CODE: NORMAL
BLOOD BANK PRODUCT CODE: NORMAL
BPU ID: NORMAL
BPU ID: NORMAL
DESCRIPTION BLOOD BANK: NORMAL
DESCRIPTION BLOOD BANK: NORMAL

## 2019-10-30 PROCEDURE — 2580000003 HC RX 258

## 2019-10-30 PROCEDURE — P9016 RBC LEUKOCYTES REDUCED: HCPCS

## 2019-10-30 PROCEDURE — 36430 TRANSFUSION BLD/BLD COMPNT: CPT

## 2019-10-30 RX ORDER — 0.9 % SODIUM CHLORIDE 0.9 %
250 INTRAVENOUS SOLUTION INTRAVENOUS ONCE
Status: COMPLETED | OUTPATIENT
Start: 2019-10-30 | End: 2019-10-30

## 2019-10-30 RX ORDER — 0.9 % SODIUM CHLORIDE 0.9 %
250 INTRAVENOUS SOLUTION INTRAVENOUS ONCE
Status: CANCELLED | OUTPATIENT
Start: 2019-10-30

## 2019-10-30 RX ORDER — SODIUM CHLORIDE 9 MG/ML
INJECTION, SOLUTION INTRAVENOUS
Status: COMPLETED
Start: 2019-10-30 | End: 2019-10-30

## 2019-10-30 RX ADMIN — Medication 250 ML: at 09:25

## 2019-10-30 RX ADMIN — SODIUM CHLORIDE 250 ML: 9 INJECTION, SOLUTION INTRAVENOUS at 09:25

## 2019-10-30 NOTE — FLOWSHEET NOTE
First unit transfusing for fifteen minutes. Patient tolerating well. Call light within reach. Reviewed signs of transfusion reaction with patient who verbalized knowledge and understanding. Patient able to turn and reposition self in bed for comfort.

## 2019-10-30 NOTE — FLOWSHEET NOTE
Patient resting in bed, alert oriented. Infusion consent reviewed and obtained. Call light use and safety reviewed.

## 2019-10-30 NOTE — FLOWSHEET NOTE
Patient to the floor via wheelchair for his blood transfusion. Assisted with I person assist. Repositioned in bed. Call light within reach. Head of the bed is elevated. Rosa to MICHELLE.  Eating breakfast. Blood bank notified of arrival .

## 2021-11-12 NOTE — PROGRESS NOTES
Clinical Pharmacy Note    Dorie Harper is a 80 y.o. male for whom pharmacy has been asked to manage warfarin therapy. Reason for Admission: dyspnea, weakness, fall, edema     Consulting Physician: Dr. Anibal Bowers   Warfarin dose prior to admission: warfarin 2mg daily, 14mg TWD  Warfarin Indication: history of DVT and PE with filter, atrial fibrillation   Target INR range: 2-3  Order for concomitant anticoagulant therapy: none at this time     Outpatient warfarin provider: patient follows with primary care     Past Medical History:   Diagnosis Date    Anxiety disorder     Arthritis     Carbajal's esophagus     Cataract     surgery    DVT (deep venous thrombosis) (HCC)     GERD (gastroesophageal reflux disease)     HTN (hypertension)     Prostate enlargement     Pulmonary embolism ([facility] [address]     Type 2 diabetes mellitus ([facility] [address]                Recent Labs     06/28/19  0820   INR 2.2     Recent Labs     06/28/19  0820   HGB 7.4*   HCT 23.0*          Current warfarin drug-drug interactions: none at this time (all medications not yet reordered)    Date INR Warfarin Dose   06/28/19 2.2  2mg                                      INR of 2.2 is within goal range of 2-3 for history of DVT and PE with filter placement as well as atrial fibrillation. Normal maintenance dosage is warfarin 2mg daily, or 14mg TWD. Will give home dosage of warfarin 2mg today only. Continue with daily PT/INR until stable within therapeutic range. Thank you for the consult.     Winnie Joshi, PharmD   6/28/2019 11:20 AM Teaching and training

## 2022-06-29 NOTE — FLOWSHEET NOTE
AFTER YOUR PROCEDURE    Date of Procedure: 6/29/2022    You had the following procedure(s) performed today:  Colonoscopy    Exam Results: The Physician removed 1 polyp(s), which will be sent to the lab for testing. and Lab results will be called or mailed to you within 7-10 business days. Please follow up as directed in your final lab report     Diet Instructions: You may resume your regular diet today. It is recommended to avoid heavy, greasy, and spicy foods today. Medications: You may resume your medications as prescribed. Activity for Today:    DO NOT DRIVE. Do not operate any other heavy machinery or equipment. Avoid activities that require coordination (climbing ladders, using a knife/cooking equipment, etc.)  Do not make important financial or legal decisions  Do not return to work. Do not drink any alcohol. You may resume your activity tomorrow. It is normal to have. .... Colonoscopy / Sigmoidoscopy   Mild abdominal distention and/or cramping are normal after these procedures, but should pass within an hour or two with the passage of air. Mild nausea and/or vomiting       When to call Dr. Coty Zapata at [phone]: For Colonoscopy / Sigmoidoscopy   If you have unusual belly pain that is NOT relieved with passing air  If you have rectal bleeding more than blood streaking on the toilet tissue  If you have moderate nausea and/or vomiting         Go to the Emergency Room if you experience any of the following:  Severe pain  Difficulty breathing or swallowing  Persistent vomiting  Vomiting blood, either brown (coffee ground in consistency) or red  Significant rectal bleeding  Chills or fever over 101 degrees F. Additional Instructions:  Any further questions after hours please contact [place] 24 hours nurse call center at [phone]. Second unit of blood started. RN stayed with patient for first 15 minutes.  Patient tolerating well